# Patient Record
Sex: FEMALE | Employment: UNEMPLOYED | URBAN - METROPOLITAN AREA
[De-identification: names, ages, dates, MRNs, and addresses within clinical notes are randomized per-mention and may not be internally consistent; named-entity substitution may affect disease eponyms.]

---

## 2020-06-12 LAB
C. TRACHOMATIS, EXTERNAL RESULT: NEGATIVE
N. GONORRHOEAE, EXTERNAL RESULT: NEGATIVE

## 2020-08-21 LAB
ABO, EXTERNAL RESULT: NORMAL
HEP B, EXTERNAL RESULT: NEGATIVE
HIV, EXTERNAL RESULT: NEGATIVE
RH FACTOR, EXTERNAL RESULT: POSITIVE
RPR, EXTERNAL RESULT: NONREACTIVE
RUBELLA TITER, EXTERNAL RESULT: NORMAL

## 2021-02-10 ENCOUNTER — OFFICE VISIT (OUTPATIENT)
Dept: PRIMARY CARE CLINIC | Age: 36
End: 2021-02-10

## 2021-02-10 DIAGNOSIS — Z11.59 SCREENING FOR VIRAL DISEASE: Primary | ICD-10-CM

## 2021-02-10 DIAGNOSIS — Z01.818 PREOP TESTING: ICD-10-CM

## 2021-02-10 LAB — GBS, EXTERNAL RESULT: NEGATIVE

## 2021-02-10 PROCEDURE — 99211 OFF/OP EST MAY X REQ PHY/QHP: CPT | Performed by: NURSE PRACTITIONER

## 2021-02-10 NOTE — PROGRESS NOTES
Mooney Rasp received a viral test for COVID-19. They were educated on isolation and quarantine as appropriate. For any symptoms, they were directed to seek care from their PCP, given contact information to establish with a doctor, directed to an urgent care or the emergency room.

## 2021-02-11 LAB — SARS-COV-2, PCR: NOT DETECTED

## 2021-02-16 ENCOUNTER — HOSPITAL ENCOUNTER (INPATIENT)
Age: 36
LOS: 4 days | Discharge: HOME | End: 2021-02-20
Attending: OBSTETRICS & GYNECOLOGY | Admitting: OBSTETRICS & GYNECOLOGY
Payer: COMMERCIAL

## 2021-02-16 PROBLEM — O14.93 PREECLAMPSIA, THIRD TRIMESTER: Status: ACTIVE | Noted: 2021-02-16

## 2021-02-16 LAB
A/G RATIO: 1.1 (ref 1.1–2.2)
ABO/RH: NORMAL
ALBUMIN SERPL-MCNC: 3.7 G/DL (ref 3.4–5)
ALP BLD-CCNC: 165 U/L (ref 40–129)
ALT SERPL-CCNC: 21 U/L (ref 10–40)
AMPHETAMINE SCREEN, URINE: NORMAL
ANION GAP SERPL CALCULATED.3IONS-SCNC: 12 MMOL/L (ref 3–16)
ANTIBODY SCREEN: NORMAL
AST SERPL-CCNC: 19 U/L (ref 15–37)
BARBITURATE SCREEN URINE: NORMAL
BASOPHILS ABSOLUTE: 0.1 K/UL (ref 0–0.2)
BASOPHILS RELATIVE PERCENT: 0.7 %
BENZODIAZEPINE SCREEN, URINE: NORMAL
BILIRUB SERPL-MCNC: <0.2 MG/DL (ref 0–1)
BILIRUBIN URINE: NEGATIVE
BLOOD, URINE: ABNORMAL
BUN BLDV-MCNC: 12 MG/DL (ref 7–20)
BUPRENORPHINE URINE: NORMAL
CALCIUM SERPL-MCNC: 9.5 MG/DL (ref 8.3–10.6)
CANNABINOID SCREEN URINE: NORMAL
CHLORIDE BLD-SCNC: 101 MMOL/L (ref 99–110)
CLARITY: CLEAR
CO2: 20 MMOL/L (ref 21–32)
COCAINE METABOLITE SCREEN URINE: NORMAL
COLOR: YELLOW
CREAT SERPL-MCNC: <0.5 MG/DL (ref 0.6–1.1)
CREATININE URINE: 40.1 MG/DL (ref 28–259)
EOSINOPHILS ABSOLUTE: 0.2 K/UL (ref 0–0.6)
EOSINOPHILS RELATIVE PERCENT: 2.6 %
EPITHELIAL CELLS, UA: ABNORMAL /HPF (ref 0–5)
GFR AFRICAN AMERICAN: >60
GFR NON-AFRICAN AMERICAN: >60
GLOBULIN: 3.3 G/DL
GLUCOSE BLD-MCNC: 73 MG/DL (ref 70–99)
GLUCOSE URINE: NEGATIVE MG/DL
HCT VFR BLD CALC: 37.8 % (ref 36–48)
HEMOGLOBIN: 12.4 G/DL (ref 12–16)
KETONES, URINE: NEGATIVE MG/DL
LEUKOCYTE ESTERASE, URINE: NEGATIVE
LYMPHOCYTES ABSOLUTE: 2.1 K/UL (ref 1–5.1)
LYMPHOCYTES RELATIVE PERCENT: 24.8 %
Lab: NORMAL
MCH RBC QN AUTO: 30 PG (ref 26–34)
MCHC RBC AUTO-ENTMCNC: 33 G/DL (ref 31–36)
MCV RBC AUTO: 91 FL (ref 80–100)
METHADONE SCREEN, URINE: NORMAL
MICROSCOPIC EXAMINATION: YES
MONOCYTES ABSOLUTE: 0.7 K/UL (ref 0–1.3)
MONOCYTES RELATIVE PERCENT: 7.9 %
NEUTROPHILS ABSOLUTE: 5.5 K/UL (ref 1.7–7.7)
NEUTROPHILS RELATIVE PERCENT: 64 %
NITRITE, URINE: NEGATIVE
OPIATE SCREEN URINE: NORMAL
OXYCODONE URINE: NORMAL
PDW BLD-RTO: 12.9 % (ref 12.4–15.4)
PH UA: 7
PH UA: 7 (ref 5–8)
PHENCYCLIDINE SCREEN URINE: NORMAL
PLATELET # BLD: 173 K/UL (ref 135–450)
PMV BLD AUTO: 10.8 FL (ref 5–10.5)
POTASSIUM SERPL-SCNC: 3.9 MMOL/L (ref 3.5–5.1)
PROPOXYPHENE SCREEN: NORMAL
PROTEIN PROTEIN: 98 MG/DL
PROTEIN UA: 100 MG/DL
PROTEIN/CREAT RATIO: 2.4 MG/DL
RBC # BLD: 4.15 M/UL (ref 4–5.2)
RBC UA: ABNORMAL /HPF (ref 0–4)
SODIUM BLD-SCNC: 133 MMOL/L (ref 136–145)
SPECIFIC GRAVITY UA: 1.01 (ref 1–1.03)
TOTAL PROTEIN: 7 G/DL (ref 6.4–8.2)
URIC ACID, SERUM: 4.4 MG/DL (ref 2.6–6)
URINE TYPE: ABNORMAL
UROBILINOGEN, URINE: 0.2 E.U./DL
WBC # BLD: 8.6 K/UL (ref 4–11)
WBC UA: ABNORMAL /HPF (ref 0–5)

## 2021-02-16 PROCEDURE — 86900 BLOOD TYPING SEROLOGIC ABO: CPT

## 2021-02-16 PROCEDURE — 84550 ASSAY OF BLOOD/URIC ACID: CPT

## 2021-02-16 PROCEDURE — 84156 ASSAY OF PROTEIN URINE: CPT

## 2021-02-16 PROCEDURE — 86780 TREPONEMA PALLIDUM: CPT

## 2021-02-16 PROCEDURE — 6360000002 HC RX W HCPCS: Performed by: OBSTETRICS & GYNECOLOGY

## 2021-02-16 PROCEDURE — 2580000003 HC RX 258: Performed by: OBSTETRICS & GYNECOLOGY

## 2021-02-16 PROCEDURE — 86850 RBC ANTIBODY SCREEN: CPT

## 2021-02-16 PROCEDURE — 82570 ASSAY OF URINE CREATININE: CPT

## 2021-02-16 PROCEDURE — 85025 COMPLETE CBC W/AUTO DIFF WBC: CPT

## 2021-02-16 PROCEDURE — 6370000000 HC RX 637 (ALT 250 FOR IP): Performed by: OBSTETRICS & GYNECOLOGY

## 2021-02-16 PROCEDURE — 80307 DRUG TEST PRSMV CHEM ANLYZR: CPT

## 2021-02-16 PROCEDURE — 80053 COMPREHEN METABOLIC PANEL: CPT

## 2021-02-16 PROCEDURE — 81001 URINALYSIS AUTO W/SCOPE: CPT

## 2021-02-16 PROCEDURE — 2500000003 HC RX 250 WO HCPCS

## 2021-02-16 PROCEDURE — 86901 BLOOD TYPING SEROLOGIC RH(D): CPT

## 2021-02-16 PROCEDURE — 1220000000 HC SEMI PRIVATE OB R&B

## 2021-02-16 RX ORDER — BUDESONIDE AND FORMOTEROL FUMARATE DIHYDRATE 80; 4.5 UG/1; UG/1
2 AEROSOL RESPIRATORY (INHALATION) 2 TIMES DAILY
COMMUNITY

## 2021-02-16 RX ORDER — SODIUM CHLORIDE 0.9 % (FLUSH) 0.9 %
10 SYRINGE (ML) INJECTION PRN
Status: DISCONTINUED | OUTPATIENT
Start: 2021-02-16 | End: 2021-02-20 | Stop reason: HOSPADM

## 2021-02-16 RX ORDER — LABETALOL HYDROCHLORIDE 5 MG/ML
20 INJECTION, SOLUTION INTRAVENOUS ONCE
Status: COMPLETED | OUTPATIENT
Start: 2021-02-16 | End: 2021-02-16

## 2021-02-16 RX ORDER — LEVOTHYROXINE SODIUM 0.03 MG/1
75 TABLET ORAL DAILY
Status: DISCONTINUED | OUTPATIENT
Start: 2021-02-17 | End: 2021-02-20 | Stop reason: HOSPADM

## 2021-02-16 RX ORDER — LEVOTHYROXINE SODIUM 0.07 MG/1
75 TABLET ORAL DAILY
COMMUNITY

## 2021-02-16 RX ORDER — SODIUM CHLORIDE 0.9 % (FLUSH) 0.9 %
10 SYRINGE (ML) INJECTION EVERY 12 HOURS SCHEDULED
Status: DISCONTINUED | OUTPATIENT
Start: 2021-02-16 | End: 2021-02-20 | Stop reason: HOSPADM

## 2021-02-16 RX ORDER — MAGNESIUM SULFATE IN WATER 40 MG/ML
INJECTION, SOLUTION INTRAVENOUS
Status: DISCONTINUED
Start: 2021-02-16 | End: 2021-02-16 | Stop reason: WASHOUT

## 2021-02-16 RX ORDER — ONDANSETRON 2 MG/ML
4 INJECTION INTRAMUSCULAR; INTRAVENOUS EVERY 6 HOURS PRN
Status: DISCONTINUED | OUTPATIENT
Start: 2021-02-16 | End: 2021-02-20 | Stop reason: HOSPADM

## 2021-02-16 RX ORDER — DIPHENHYDRAMINE HYDROCHLORIDE 50 MG/ML
25 INJECTION INTRAMUSCULAR; INTRAVENOUS EVERY 4 HOURS PRN
Status: DISCONTINUED | OUTPATIENT
Start: 2021-02-16 | End: 2021-02-20 | Stop reason: HOSPADM

## 2021-02-16 RX ORDER — ACETAMINOPHEN 325 MG/1
650 TABLET ORAL EVERY 4 HOURS PRN
Status: DISCONTINUED | OUTPATIENT
Start: 2021-02-16 | End: 2021-02-20 | Stop reason: HOSPADM

## 2021-02-16 RX ORDER — MAGNESIUM SULFATE IN WATER 40 MG/ML
4000 INJECTION, SOLUTION INTRAVENOUS ONCE
Status: COMPLETED | OUTPATIENT
Start: 2021-02-16 | End: 2021-02-17

## 2021-02-16 RX ORDER — LIDOCAINE HYDROCHLORIDE 10 MG/ML
30 INJECTION, SOLUTION EPIDURAL; INFILTRATION; INTRACAUDAL; PERINEURAL PRN
Status: DISCONTINUED | OUTPATIENT
Start: 2021-02-16 | End: 2021-02-18

## 2021-02-16 RX ORDER — CETIRIZINE HYDROCHLORIDE 10 MG/1
10 TABLET ORAL DAILY
COMMUNITY

## 2021-02-16 RX ORDER — MAGNESIUM SULFATE IN WATER 40 MG/ML
INJECTION, SOLUTION INTRAVENOUS
Status: DISPENSED
Start: 2021-02-16 | End: 2021-02-17

## 2021-02-16 RX ORDER — LABETALOL HYDROCHLORIDE 5 MG/ML
INJECTION, SOLUTION INTRAVENOUS
Status: COMPLETED
Start: 2021-02-16 | End: 2021-02-16

## 2021-02-16 RX ORDER — SODIUM CHLORIDE, SODIUM LACTATE, POTASSIUM CHLORIDE, CALCIUM CHLORIDE 600; 310; 30; 20 MG/100ML; MG/100ML; MG/100ML; MG/100ML
INJECTION, SOLUTION INTRAVENOUS CONTINUOUS
Status: DISCONTINUED | OUTPATIENT
Start: 2021-02-16 | End: 2021-02-20 | Stop reason: HOSPADM

## 2021-02-16 RX ADMIN — Medication 25 MCG: at 20:10

## 2021-02-16 RX ADMIN — LABETALOL HYDROCHLORIDE 20 MG: 5 INJECTION INTRAVENOUS at 15:03

## 2021-02-16 RX ADMIN — SODIUM CHLORIDE, POTASSIUM CHLORIDE, SODIUM LACTATE AND CALCIUM CHLORIDE: 600; 310; 30; 20 INJECTION, SOLUTION INTRAVENOUS at 14:40

## 2021-02-16 RX ADMIN — LABETALOL HYDROCHLORIDE 20 MG: 5 INJECTION, SOLUTION INTRAVENOUS at 15:03

## 2021-02-16 RX ADMIN — LABETALOL HYDROCHLORIDE 20 MG: 5 INJECTION, SOLUTION INTRAVENOUS at 21:45

## 2021-02-16 RX ADMIN — SODIUM CHLORIDE, POTASSIUM CHLORIDE, SODIUM LACTATE AND CALCIUM CHLORIDE: 600; 310; 30; 20 INJECTION, SOLUTION INTRAVENOUS at 23:01

## 2021-02-16 RX ADMIN — Medication 25 MCG: at 15:55

## 2021-02-16 RX ADMIN — MAGNESIUM SULFATE 4000 MG: 4 INJECTION INTRAVENOUS at 22:09

## 2021-02-16 RX ADMIN — ACETAMINOPHEN 650 MG: 325 TABLET ORAL at 21:15

## 2021-02-16 RX ADMIN — MAGNESIUM SULFATE HEPTAHYDRATE 2 G/HR: 40 INJECTION, SOLUTION INTRAVENOUS at 22:09

## 2021-02-16 SDOH — HEALTH STABILITY: MENTAL HEALTH: HOW OFTEN DO YOU HAVE A DRINK CONTAINING ALCOHOL?: NEVER

## 2021-02-16 ASSESSMENT — PAIN SCALES - GENERAL: PAINLEVEL_OUTOF10: 6

## 2021-02-16 NOTE — PROGRESS NOTES
Pt put one efm at this time. Pt is feeling baby move. Pt denies leaking of amniotic fluid and vaginal bleeding.

## 2021-02-16 NOTE — PROGRESS NOTES
Pt presents to [facility] after being seen in the office and having elevated blood pressures and proteinuria. Pt ambulated to room 386 for admission.  Pt changed into gown and urine specimen obtained

## 2021-02-16 NOTE — PLAN OF CARE
Problem: Anxiety:  Goal: Level of anxiety will decrease  Description: Level of anxiety will decrease  Outcome: Ongoing     Problem: Breathing Pattern - Ineffective:  Goal: Able to breathe comfortably  Description: Able to breathe comfortably  Outcome: Ongoing     Problem: Fluid Volume - Imbalance:  Goal: Absence of imbalanced fluid volume signs and symptoms  Description: Absence of imbalanced fluid volume signs and symptoms  Outcome: Ongoing  Goal: Absence of intrapartum hemorrhage signs and symptoms  Description: Absence of intrapartum hemorrhage signs and symptoms  Outcome: Ongoing     Problem: Infection - Intrapartum Infection:  Goal: Will show no infection signs and symptoms  Description: Will show no infection signs and symptoms  Outcome: Ongoing     Problem: Labor Process - Prolonged:  Goal: Labor progression, first stage, within specified pattern  Description: Labor progression, first stage, within specified pattern  Outcome: Ongoing  Goal: Labor progession, second stage, within specified pattern  Description: Labor progession, second stage, within specified pattern  Outcome: Ongoing  Goal: Uterine contractions within specified parameters  Description: Uterine contractions within specified parameters  Outcome: Ongoing     Problem:  Screening:  Goal: Ability to make informed decisions regarding treatment has improved  Description: Ability to make informed decisions regarding treatment has improved  Outcome: Ongoing     Problem: Pain - Acute:  Goal: Pain level will decrease  Description: Pain level will decrease  Outcome: Ongoing  Goal: Able to cope with pain  Description: Able to cope with pain  Outcome: Ongoing     Problem: Tissue Perfusion - Uteroplacental, Altered:  Goal: Absence of abnormal fetal heart rate pattern  Description: Absence of abnormal fetal heart rate pattern  Outcome: Ongoing     Problem: Urinary Retention:  Goal: Experiences of bladder distention will decrease  Description: Experiences of bladder distention will decrease  Outcome: Ongoing  Goal: Urinary elimination within specified parameters  Description: Urinary elimination within specified parameters  Outcome: Ongoing       Pt in stable condition

## 2021-02-17 ENCOUNTER — ANESTHESIA (OUTPATIENT)
Dept: LABOR AND DELIVERY | Age: 36
End: 2021-02-17
Payer: COMMERCIAL

## 2021-02-17 ENCOUNTER — ANESTHESIA EVENT (OUTPATIENT)
Dept: LABOR AND DELIVERY | Age: 36
End: 2021-02-17
Payer: COMMERCIAL

## 2021-02-17 LAB — TOTAL SYPHILLIS IGG/IGM: NORMAL

## 2021-02-17 PROCEDURE — 3700000025 EPIDURAL BLOCK: Performed by: ANESTHESIOLOGY

## 2021-02-17 PROCEDURE — 6370000000 HC RX 637 (ALT 250 FOR IP): Performed by: OBSTETRICS & GYNECOLOGY

## 2021-02-17 PROCEDURE — 6360000002 HC RX W HCPCS

## 2021-02-17 PROCEDURE — 1220000000 HC SEMI PRIVATE OB R&B

## 2021-02-17 PROCEDURE — 2500000003 HC RX 250 WO HCPCS: Performed by: NURSE ANESTHETIST, CERTIFIED REGISTERED

## 2021-02-17 PROCEDURE — 2580000003 HC RX 258: Performed by: OBSTETRICS & GYNECOLOGY

## 2021-02-17 PROCEDURE — 6360000002 HC RX W HCPCS: Performed by: OBSTETRICS & GYNECOLOGY

## 2021-02-17 RX ORDER — EPHEDRINE SULFATE 50 MG/ML
INJECTION INTRAVENOUS PRN
Status: DISCONTINUED | OUTPATIENT
Start: 2021-02-17 | End: 2021-02-18 | Stop reason: SDUPTHER

## 2021-02-17 RX ORDER — BUPIVACAINE HYDROCHLORIDE 2.5 MG/ML
INJECTION, SOLUTION EPIDURAL; INFILTRATION; INTRACAUDAL PRN
Status: DISCONTINUED | OUTPATIENT
Start: 2021-02-17 | End: 2021-02-18 | Stop reason: SDUPTHER

## 2021-02-17 RX ORDER — BUPIVACAINE HYDROCHLORIDE 5 MG/ML
INJECTION, SOLUTION EPIDURAL; INTRACAUDAL PRN
Status: DISCONTINUED | OUTPATIENT
Start: 2021-02-17 | End: 2021-02-18 | Stop reason: SDUPTHER

## 2021-02-17 RX ORDER — BUPIVACAINE HYDROCHLORIDE 5 MG/ML
INJECTION, SOLUTION EPIDURAL; INTRACAUDAL
Status: COMPLETED
Start: 2021-02-17 | End: 2021-02-17

## 2021-02-17 RX ORDER — EPHEDRINE SULFATE 50 MG/ML
INJECTION INTRAVENOUS
Status: COMPLETED
Start: 2021-02-17 | End: 2021-02-17

## 2021-02-17 RX ORDER — BUPIVACAINE HYDROCHLORIDE 2.5 MG/ML
INJECTION, SOLUTION EPIDURAL; INFILTRATION; INTRACAUDAL
Status: COMPLETED
Start: 2021-02-17 | End: 2021-02-17

## 2021-02-17 RX ADMIN — ACETAMINOPHEN 650 MG: 325 TABLET ORAL at 13:17

## 2021-02-17 RX ADMIN — Medication 1 MILLI-UNITS/MIN: at 10:00

## 2021-02-17 RX ADMIN — BUPIVACAINE HYDROCHLORIDE 3 ML: 2.5 INJECTION, SOLUTION EPIDURAL; INFILTRATION; INTRACAUDAL; PERINEURAL at 14:23

## 2021-02-17 RX ADMIN — BUPIVACAINE HYDROCHLORIDE 3 ML: 5 INJECTION, SOLUTION EPIDURAL; INTRACAUDAL; PERINEURAL at 14:23

## 2021-02-17 RX ADMIN — MAGNESIUM SULFATE HEPTAHYDRATE 2 G/HR: 40 INJECTION, SOLUTION INTRAVENOUS at 17:44

## 2021-02-17 RX ADMIN — ACETAMINOPHEN 650 MG: 325 TABLET ORAL at 17:16

## 2021-02-17 RX ADMIN — Medication 15 ML/HR: at 14:27

## 2021-02-17 RX ADMIN — MAGNESIUM SULFATE HEPTAHYDRATE 2 G/HR: 40 INJECTION, SOLUTION INTRAVENOUS at 08:11

## 2021-02-17 RX ADMIN — ACETAMINOPHEN 650 MG: 325 TABLET ORAL at 03:18

## 2021-02-17 RX ADMIN — EPHEDRINE SULFATE 25 MG: 50 INJECTION INTRAVENOUS at 14:31

## 2021-02-17 RX ADMIN — ACETAMINOPHEN 650 MG: 325 TABLET ORAL at 07:37

## 2021-02-17 RX ADMIN — EPHEDRINE SULFATE 25 MG: 50 INJECTION INTRAVENOUS at 14:33

## 2021-02-17 RX ADMIN — SODIUM CHLORIDE, POTASSIUM CHLORIDE, SODIUM LACTATE AND CALCIUM CHLORIDE: 600; 310; 30; 20 INJECTION, SOLUTION INTRAVENOUS at 12:05

## 2021-02-17 RX ADMIN — SODIUM CHLORIDE, POTASSIUM CHLORIDE, SODIUM LACTATE AND CALCIUM CHLORIDE: 600; 310; 30; 20 INJECTION, SOLUTION INTRAVENOUS at 14:33

## 2021-02-17 RX ADMIN — LEVOTHYROXINE SODIUM 75 MCG: 0.03 TABLET ORAL at 07:37

## 2021-02-17 RX ADMIN — ONDANSETRON 4 MG: 2 INJECTION INTRAMUSCULAR; INTRAVENOUS at 10:24

## 2021-02-17 RX ADMIN — ACETAMINOPHEN 650 MG: 325 TABLET ORAL at 21:23

## 2021-02-17 ASSESSMENT — PAIN SCALES - GENERAL
PAINLEVEL_OUTOF10: 7
PAINLEVEL_OUTOF10: 5

## 2021-02-17 NOTE — PLAN OF CARE
Problem: Anxiety:  Goal: Level of anxiety will decrease  Description: Level of anxiety will decrease  2/17/2021 0731 by Blaise Anand RN  Outcome: Ongoing  2/16/2021 1952 by Olegario Oppenheim, RN  Outcome: Ongoing     Problem: Breathing Pattern - Ineffective:  Goal: Able to breathe comfortably  Description: Able to breathe comfortably  2/17/2021 0731 by Blaise Anand RN  Outcome: Ongoing  2/16/2021 1952 by Olegario Oppenheim, RN  Outcome: Ongoing     Problem: Fluid Volume - Imbalance:  Goal: Absence of imbalanced fluid volume signs and symptoms  Description: Absence of imbalanced fluid volume signs and symptoms  2/17/2021 0731 by Blaise Anand RN  Outcome: Ongoing  2/16/2021 1952 by Olegario Oppenheim, RN  Outcome: Ongoing  Goal: Absence of intrapartum hemorrhage signs and symptoms  Description: Absence of intrapartum hemorrhage signs and symptoms  2/17/2021 0731 by Blaise Anand RN  Outcome: Ongoing  2/16/2021 1952 by Olegario Oppenheim, RN  Outcome: Ongoing     Problem: Infection - Intrapartum Infection:  Goal: Will show no infection signs and symptoms  Description: Will show no infection signs and symptoms  2/17/2021 0731 by Blaise Anand RN  Outcome: Ongoing  2/16/2021 1952 by Olegario Oppenheim, RN  Outcome: Ongoing     Problem: Labor Process - Prolonged:  Goal: Labor progression, first stage, within specified pattern  Description: Labor progression, first stage, within specified pattern  2/17/2021 0731 by Blaise Anand RN  Outcome: Ongoing  2/16/2021 1952 by Olegario Oppenheim, RN  Outcome: Ongoing  Goal: Labor progession, second stage, within specified pattern  Description: Labor progession, second stage, within specified pattern  2/17/2021 0731 by Blaise Anand RN  Outcome: Ongoing  2/16/2021 1952 by Olegario Oppenheim, RN  Outcome: Ongoing  Goal: Uterine contractions within specified parameters  Description: Uterine contractions within specified parameters  2/17/2021 0731 by Cheryle Hopping Marcia Pruitt RN  Outcome: Ongoing  2021 by Alina Brooks RN  Outcome: Ongoing     Problem:  Screening:  Goal: Ability to make informed decisions regarding treatment has improved  Description: Ability to make informed decisions regarding treatment has improved  2021 by Eliana Allen RN  Outcome: Ongoing  2021 by Alina Brooks RN  Outcome: Ongoing     Problem: Pain - Acute:  Goal: Pain level will decrease  Description: Pain level will decrease  2021 by Eliana Allen RN  Outcome: Ongoing  2021 by Alina Brooks RN  Outcome: Ongoing  Goal: Able to cope with pain  Description: Able to cope with pain  2021 by Eliana Allen RN  Outcome: Ongoing  2021 by Alina Brooks RN  Outcome: Ongoing     Problem: Tissue Perfusion - Uteroplacental, Altered:  Goal: Absence of abnormal fetal heart rate pattern  Description: Absence of abnormal fetal heart rate pattern  2021 by Eliana Allen RN  Outcome: Ongoing  2021 by Alina Brooks RN  Outcome: Ongoing     Problem: Urinary Retention:  Goal: Experiences of bladder distention will decrease  Description: Experiences of bladder distention will decrease  2021 by Eliana Allen RN  Outcome: Ongoing  2021 by Alina Brooks RN  Outcome: Ongoing  Goal: Urinary elimination within specified parameters  Description: Urinary elimination within specified parameters  2021 by Eliana Allen RN  Outcome: Ongoing  2021 by Alina Brooks RN  Outcome: Ongoing     Problem: Pain:  Goal: Pain level will decrease  Description: Pain level will decrease  2021 by Eliana Allen RN  Outcome: Ongoing  2021 by Alina Brooks RN  Outcome: Ongoing  Goal: Control of acute pain  Description: Control of acute pain  Outcome: Ongoing  Goal: Control of chronic pain  Description: Control of chronic pain  Outcome: Ongoing

## 2021-02-17 NOTE — PROGRESS NOTES
Dr. Meli Abdi notified via telephone to update that stone bulb came out. Also made him aware that patient is having small amount of bleeding after stone bulb removal.  Attempted SVE and was unable to check cervix d/t fetus/cervix high. FHR remains category 1 at this time. States to sit patient up and will be to bedside to evaluate vaginal bleeding.

## 2021-02-17 NOTE — ANESTHESIA PRE PROCEDURE
Department of Anesthesiology  Preprocedure Note       Name:  Bienvenido Hill   Age:  37 y.o.  :  1985                                          MRN:  [id number]         Date:  2021      Surgeon: * No surgeons listed *    Procedure: * No procedures listed *    Medications prior to admission:   Prior to Admission medications    Medication Sig Start Date End Date Taking?  Authorizing Provider   levothyroxine (SYNTHROID) 75 MCG tablet Take 75 mcg by mouth Daily   Yes Historical Provider, MD   Prenatal MV-Min-Fe Fum-FA-DHA (PRENATAL 1 PO) Take 1 tablet by mouth daily   Yes Historical Provider, MD   budesonide-formoterol (SYMBICORT) 80-4.5 MCG/ACT AERO Inhale 2 puffs into the lungs 2 times daily   Yes Historical Provider, MD   cetirizine (ZYRTEC) 10 MG tablet Take 10 mg by mouth daily   Yes Historical Provider, MD       Current medications:    Current Facility-Administered Medications   Medication Dose Route Frequency Provider Last Rate Last Admin    oxytocin (PITOCIN) 30 units in 500 mL infusion  1-20 jennyfer-units/min Intravenous Continuous Ashley Mancia MD 6 mL/hr at 21 1400 6 jennyfer-units/min at 21 1400    lactated ringers infusion   Intravenous Continuous Suzy Marlow MD 69 mL/hr at 21 1433 New Bag at 21 1433    sodium chloride flush 0.9 % injection 10 mL  10 mL Intravenous 2 times per day Suzy Marolw MD        sodium chloride flush 0.9 % injection 10 mL  10 mL Intravenous PRN Suzy Marlow MD        lidocaine PF 1 % injection 30 mL  30 mL Other PRN Suzy Marlow MD        [facility] injection 4 mg  4 mg Intravenous Q6H PRN Suzy Marlow MD   4 mg at 21 1024    diphenhydrAMINE (BENADRYL) injection 25 mg  25 mg Intravenous Q4H PRN Suzy Marlow MD        acetaminophen (TYLENOL) tablet 650 mg  650 mg Oral Q4H PRN Suzy Marlow MD   650 mg at 21 1317  levothyroxine (SYNTHROID) tablet 75 mcg  75 mcg Oral Daily Rolan Castro MD   75 mcg at 02/17/21 3764    magnesium sulfate (20 G/500 mL) infusion  2 g/hr Intravenous Continuous Rolan Castro MD 50 mL/hr at 02/17/21 1300 2 g/hr at 02/17/21 1300     Facility-Administered Medications Ordered in Other Encounters   Medication Dose Route Frequency Provider Last Rate Last Admin    bupivacaine (PF) (MARCAINE) 0.25 % injection    PRN Kiera Hakarlaing, APRN - CRNA   3 mL at 02/17/21 1423    bupivacaine (PF) (MARCAINE) 0.5 % injection    PRN Kiera Lewis, APRN - CRNA   3 mL at 02/17/21 1423    sodium chloride 0.9 % 200 mL with fentaNYL 500 mcg, bupivacaine 0.5% 50 mL (OB) epidural    Continuous PRN Kiera Hakarlaing, APRN - CRNA 15 mL/hr at 02/17/21 1427 15 mL/hr at 02/17/21 1427    ePHEDrine injection    PRN Kiera Lewis, APRN - CRNA   25 mg at 02/17/21 1433       Allergies:  No Known Allergies    Problem List:    Patient Active Problem List   Diagnosis Code    Preeclampsia, third trimester O14.93       Past Medical History:        Diagnosis Date    Anxiety     Asthma     currently uses inhaler    Hypertension     third trimester htn    Thyroid disease     hpothyroidism- synthroid       Past Surgical History:  History reviewed. No pertinent surgical history.     Social History:    Social History     Tobacco Use    Smoking status: Never Smoker    Smokeless tobacco: Never Used   Substance Use Topics    Alcohol use: Never     Frequency: Never                                Counseling given: Not Answered      Vital Signs (Current):   Vitals:    02/17/21 1434 02/17/21 1437 02/17/21 1440 02/17/21 1443   BP: 139/81 138/77 (!) 144/88 (!) 145/83   Pulse: 87 90 96 102   Resp: 16 16 16 16   Temp:       TempSrc:       SpO2:       Weight:       Height:                                                  BP Readings from Last 3 Encounters:   02/17/21 (!) 145/83 NPO Status: Time of last liquid consumption: 1130                        Time of last solid consumption: 1100                        Date of last liquid consumption: 02/16/21                        Date of last solid food consumption: 02/16/21    BMI:   Wt Readings from Last 3 Encounters:   02/16/21 182 lb (82.6 kg)     Body mass index is 31.24 kg/m². CBC:   Lab Results   Component Value Date    WBC 8.6 02/16/2021    RBC 4.15 02/16/2021    HGB 12.4 02/16/2021    HCT 37.8 02/16/2021    MCV 91.0 02/16/2021    RDW 12.9 02/16/2021     02/16/2021       CMP:   Lab Results   Component Value Date     02/16/2021    K 3.9 02/16/2021     02/16/2021    CO2 20 02/16/2021    BUN 12 02/16/2021    CREATININE <0.5 02/16/2021    GFRAA >60 02/16/2021    AGRATIO 1.1 02/16/2021    LABGLOM >60 02/16/2021    GLUCOSE 73 02/16/2021    PROT 7.0 02/16/2021    CALCIUM 9.5 02/16/2021    BILITOT <0.2 02/16/2021    ALKPHOS 165 02/16/2021    AST 19 02/16/2021    ALT 21 02/16/2021       POC Tests: No results for input(s): POCGLU, POCNA, POCK, POCCL, POCBUN, POCHEMO, POCHCT in the last 72 hours.     Coags: No results found for: PROTIME, INR, APTT    HCG (If Applicable): No results found for: PREGTESTUR, PREGSERUM, HCG, HCGQUANT     ABGs: No results found for: PHART, PO2ART, RRT2AUM, TSK9VIS, BEART, Q1APSNJO     Type & Screen (If Applicable):  No results found for: LABABO, LABRH    Drug/Infectious Status (If Applicable):  No results found for: HIV, HEPCAB    COVID-19 Screening (If Applicable):   Lab Results   Component Value Date    COVID19 Not Detected 02/10/2021         Anesthesia Evaluation  Patient summary reviewed and Nursing notes reviewed no history of anesthetic complications:   Airway: Mallampati: II  TM distance: >3 FB   Neck ROM: full   Dental:          Pulmonary:   (+) asthma:                            Cardiovascular:    (+) hypertension:,                   Neuro/Psych:   (+) headaches: migraine headaches, GI/Hepatic/Renal: Neg GI/Hepatic/Renal ROS            Endo/Other:    (+) hypothyroidism::., .                 Abdominal:           Vascular: negative vascular ROS. Anesthesia Plan      epidural     ASA 3 - emergent             Anesthetic plan and risks discussed with patient. Plan discussed with attending. Alternatives to, benifits and risks of continuous lumbar epidural for labor (including, but not limited to, hypotension, spinal headache, inadequate sensory blockade) were discussed in detail with the patient. All questions were answered to her satisfaction.   The patient desires and agrees to proceed with continuous epidural.    MAYRA Walden - CRNA   2/17/2021

## 2021-02-17 NOTE — PROGRESS NOTES
Spoke to Dr. Lincoln Allen at this time to inform him of pt /101. Telephone order to give 20mg labetolol, 4g bolus of magnesium sulfate, and start 2g/hr infusion of magnesium sulfate. Will continue to monitor.

## 2021-02-17 NOTE — PLAN OF CARE
Problem: Anxiety:  Goal: Level of anxiety will decrease  Description: Level of anxiety will decrease  2/16/2021 1952 by Alma Love RN  Outcome: Ongoing  2/16/2021 1646 by Ana Leyva RN  Outcome: Ongoing     Problem: Breathing Pattern - Ineffective:  Goal: Able to breathe comfortably  Description: Able to breathe comfortably  2/16/2021 1952 by Alma Love RN  Outcome: Ongoing  2/16/2021 1646 by Ana Leyva RN  Outcome: Ongoing     Problem: Fluid Volume - Imbalance:  Goal: Absence of imbalanced fluid volume signs and symptoms  Description: Absence of imbalanced fluid volume signs and symptoms  2/16/2021 1952 by Alma Love RN  Outcome: Ongoing  2/16/2021 1646 by Ana Leyva RN  Outcome: Ongoing  Goal: Absence of intrapartum hemorrhage signs and symptoms  Description: Absence of intrapartum hemorrhage signs and symptoms  2/16/2021 1952 by Alma Love RN  Outcome: Ongoing  2/16/2021 1646 by Ana Leyva RN  Outcome: Ongoing     Problem: Infection - Intrapartum Infection:  Goal: Will show no infection signs and symptoms  Description: Will show no infection signs and symptoms  2/16/2021 1952 by Alma Love RN  Outcome: Ongoing  2/16/2021 1646 by Ana Leyva RN  Outcome: Ongoing     Problem: Labor Process - Prolonged:  Goal: Labor progression, first stage, within specified pattern  Description: Labor progression, first stage, within specified pattern  2/16/2021 1952 by Alma Love RN  Outcome: Ongoing  2/16/2021 1646 by Ana Leyva RN  Outcome: Ongoing  Goal: Labor progession, second stage, within specified pattern  Description: Labor progession, second stage, within specified pattern  2/16/2021 1952 by Alma Love RN  Outcome: Ongoing  2/16/2021 1646 by Ana Leyva RN  Outcome: Ongoing  Goal: Uterine contractions within specified parameters  Description: Uterine contractions within specified parameters  2/16/2021 1952 by Rai Gilliam Eva Campuzano RN  Outcome: Ongoing  2021 1646 by Stiven Schwab RN  Outcome: Ongoing     Problem:  Screening:  Goal: Ability to make informed decisions regarding treatment has improved  Description: Ability to make informed decisions regarding treatment has improved  2021 by Adolph Bazan RN  Outcome: Ongoing  2021 1646 by Stiven Schwab RN  Outcome: Ongoing     Problem: Pain - Acute:  Goal: Pain level will decrease  Description: Pain level will decrease  2021 by Adolph Bazan RN  Outcome: Ongoing  2021 1646 by Stiven Schwab RN  Outcome: Ongoing  Goal: Able to cope with pain  Description: Able to cope with pain  2021 by Adolph Bazan RN  Outcome: Ongoing  2021 1646 by Stiven Schwab RN  Outcome: Ongoing     Problem: Tissue Perfusion - Uteroplacental, Altered:  Goal: Absence of abnormal fetal heart rate pattern  Description: Absence of abnormal fetal heart rate pattern  2021 by Adolph Bazan RN  Outcome: Ongoing  2021 1646 by Stiven Schwab RN  Outcome: Ongoing     Problem: Urinary Retention:  Goal: Experiences of bladder distention will decrease  Description: Experiences of bladder distention will decrease  2021 by Adolph Bazan RN  Outcome: Ongoing  2021 1646 by Stiven Schwab RN  Outcome: Ongoing  Goal: Urinary elimination within specified parameters  Description: Urinary elimination within specified parameters  2021 by Adolph Bazan RN  Outcome: Ongoing  2021 1646 by Stiven Schwab RN  Outcome: Ongoing

## 2021-02-17 NOTE — PROGRESS NOTES
Dr. Wong Necessary at bedside discussing plan of care, patient status, FHR tracing, updated on patient headache and nausea/vomitting and blood pressures, and stone bulb still in place. Will monitor.

## 2021-02-17 NOTE — PROGRESS NOTES
Cervical balloon catheter still in place. FHR tracing reassuring  BPs WNL  Contractions q 3-5 mins on 2 mIU/min Pitocin    PLAN:  Continue IOL, magnesium. Epidural prn.     Cecil Agarwal MD

## 2021-02-17 NOTE — PROGRESS NOTES
Pt comfortable with epidural  FHR tracing reassuring  Ctxns q 2-5 mins on 6 mIU/min Pitocin  Cvx outer os 4-5 cm, inner os 3 cm, 75% effaced, -3 to -4 station. BPs still in mild range. U.O adequate. PLAN:  Titrate Pitocin. Continue magnesium and close observation of BP.     Jeremy Townsend MD

## 2021-02-17 NOTE — PROGRESS NOTES
Dr. Marti Whelan at bedside at this time to perform SVE. Small amount of shannon red blood noted. Provider aware and visualized. Attempted SVE but unable to feel cervix due to stone bulb still in place. Will continue to monitor.

## 2021-02-17 NOTE — PROGRESS NOTES
Department of Obstetrics and Gynecology  Labor and Delivery   Attending Progress Note      SUBJECTIVE:  Pt reports increasing contractions    OBJECTIVE:      Fetal heart rate:       Baseline Heart Rate:  140        Accelerations:  present       Long Term Variability:  moderate       Decelerations:  absent         Contraction frequency: 4 minutes    Membranes:  Intact    Cervix:         Dilation:  1 cm         Effacement:  50%         Station:  -3         Position:  mid             ASSESSMENT & PLAN:    Preeclampsia with severe features undergoing induction    Continue induction cytotec/pit

## 2021-02-17 NOTE — PROGRESS NOTES
Chart reviewed and patient examined. Pt feels well, only c/o mild HA. Cvx 1/75%/-3, posterior.    Ctxns q 3 mins  FHR tracing reassuring  Cervical balloon catheter placed and inflated with 60 mL saline for uterine and vaginal balloons  Will start pitocin augmentation  BPs in mild range since last labetalol dose  Magnesium continues at 2 gms/hr  Epidural prn    Kath Moscoso MD

## 2021-02-17 NOTE — PROGRESS NOTES
Cervical stone balloon catheter placed at this time. 60 mL in both uterine and vaginal balloons. Will monitor. Pitocin to be started at 1000 per Dr. Kendra Duncan.

## 2021-02-17 NOTE — ANESTHESIA PROCEDURE NOTES
Epidural Block    Patient location during procedure: OB  Start time: 2/17/2021 2:16 PM  End time: 2/17/2021 2:25 PM  Reason for block: labor epidural  Staffing  Performed: resident/CRNA   Anesthesiologist: Saadia Vazquez MD  Resident/CRNA: MAYRA Valencia CRNA  Preanesthetic Checklist  Completed: patient identified, IV checked, site marked, risks and benefits discussed, monitors and equipment checked, pre-op evaluation, timeout performed, anesthesia consent given, oxygen available and patient being monitored  Epidural  Patient position: sitting  Prep: Betadine  Patient monitoring: cardiac monitor, continuous pulse ox and frequent blood pressure checks  Approach: midline  Location: lumbar (1-5)  Injection technique: ISABELL saline  Provider prep: mask and sterile gloves  Needle  Needle type: Tuohy   Needle gauge: 17 G  Needle length: 3.5 in  Catheter type: side hole  Catheter size: 19 G (20 G)  Test dose: negative (3 + 2 cc of 1.5 % xylocaine with epi)  Assessment  Sensory level: T8  Hemodynamics: unstable (IVF and IV/IM ephedrine)  Attempts: 1  Additional Notes  Pt. prepped and draped in sterile fashion. Skin wheal with 1% lidocaine. 17ga touhy needle to ISABELL. 25 ga. Spinal needle per touhy. CSF visualized in hub. Needle withdrawn and catheter threaded. Negative test dose. Catheter secured with sterile dressing.

## 2021-02-18 VITALS — SYSTOLIC BLOOD PRESSURE: 141 MMHG | DIASTOLIC BLOOD PRESSURE: 79 MMHG | OXYGEN SATURATION: 96 %

## 2021-02-18 LAB
HCT VFR BLD CALC: 28 % (ref 36–48)
HEMOGLOBIN: 9.4 G/DL (ref 12–16)

## 2021-02-18 PROCEDURE — 85018 HEMOGLOBIN: CPT

## 2021-02-18 PROCEDURE — 2500000003 HC RX 250 WO HCPCS: Performed by: NURSE ANESTHETIST, CERTIFIED REGISTERED

## 2021-02-18 PROCEDURE — 6360000002 HC RX W HCPCS: Performed by: OBSTETRICS & GYNECOLOGY

## 2021-02-18 PROCEDURE — 2580000003 HC RX 258: Performed by: OBSTETRICS & GYNECOLOGY

## 2021-02-18 PROCEDURE — 3609079900 HC CESAREAN SECTION: Performed by: OBSTETRICS & GYNECOLOGY

## 2021-02-18 PROCEDURE — 3700000001 HC ADD 15 MINUTES (ANESTHESIA): Performed by: OBSTETRICS & GYNECOLOGY

## 2021-02-18 PROCEDURE — 1220000000 HC SEMI PRIVATE OB R&B

## 2021-02-18 PROCEDURE — 3700000000 HC ANESTHESIA ATTENDED CARE: Performed by: OBSTETRICS & GYNECOLOGY

## 2021-02-18 PROCEDURE — 85014 HEMATOCRIT: CPT

## 2021-02-18 PROCEDURE — 6360000002 HC RX W HCPCS: Performed by: NURSE ANESTHETIST, CERTIFIED REGISTERED

## 2021-02-18 PROCEDURE — 2709999900 HC NON-CHARGEABLE SUPPLY: Performed by: OBSTETRICS & GYNECOLOGY

## 2021-02-18 PROCEDURE — 7100000001 HC PACU RECOVERY - ADDTL 15 MIN: Performed by: OBSTETRICS & GYNECOLOGY

## 2021-02-18 PROCEDURE — 7100000000 HC PACU RECOVERY - FIRST 15 MIN: Performed by: OBSTETRICS & GYNECOLOGY

## 2021-02-18 PROCEDURE — 36415 COLL VENOUS BLD VENIPUNCTURE: CPT

## 2021-02-18 RX ORDER — FERROUS SULFATE 325(65) MG
325 TABLET ORAL 2 TIMES DAILY WITH MEALS
Status: DISCONTINUED | OUTPATIENT
Start: 2021-02-18 | End: 2021-02-20 | Stop reason: HOSPADM

## 2021-02-18 RX ORDER — MORPHINE SULFATE 0.5 MG/ML
INJECTION, SOLUTION EPIDURAL; INTRATHECAL; INTRAVENOUS
Status: DISCONTINUED
Start: 2021-02-18 | End: 2021-02-18

## 2021-02-18 RX ORDER — DOCUSATE SODIUM 100 MG/1
100 CAPSULE, LIQUID FILLED ORAL 2 TIMES DAILY PRN
Status: DISCONTINUED | OUTPATIENT
Start: 2021-02-18 | End: 2021-02-20 | Stop reason: HOSPADM

## 2021-02-18 RX ORDER — IBUPROFEN 800 MG/1
800 TABLET ORAL EVERY 6 HOURS PRN
Status: DISCONTINUED | OUTPATIENT
Start: 2021-02-18 | End: 2021-02-20 | Stop reason: HOSPADM

## 2021-02-18 RX ORDER — MORPHINE SULFATE 10 MG/ML
INJECTION, SOLUTION INTRAMUSCULAR; INTRAVENOUS PRN
Status: DISCONTINUED | OUTPATIENT
Start: 2021-02-18 | End: 2021-02-18 | Stop reason: SDUPTHER

## 2021-02-18 RX ORDER — ONDANSETRON 2 MG/ML
INJECTION INTRAMUSCULAR; INTRAVENOUS PRN
Status: DISCONTINUED | OUTPATIENT
Start: 2021-02-18 | End: 2021-02-18 | Stop reason: SDUPTHER

## 2021-02-18 RX ORDER — FAMOTIDINE 20 MG/1
20 TABLET, FILM COATED ORAL PRN
Status: DISCONTINUED | OUTPATIENT
Start: 2021-02-18 | End: 2021-02-20 | Stop reason: HOSPADM

## 2021-02-18 RX ORDER — OXYCODONE HYDROCHLORIDE AND ACETAMINOPHEN 5; 325 MG/1; MG/1
2 TABLET ORAL EVERY 4 HOURS PRN
Status: DISCONTINUED | OUTPATIENT
Start: 2021-02-18 | End: 2021-02-20 | Stop reason: HOSPADM

## 2021-02-18 RX ORDER — SIMETHICONE 80 MG
80 TABLET,CHEWABLE ORAL EVERY 6 HOURS PRN
Status: DISCONTINUED | OUTPATIENT
Start: 2021-02-18 | End: 2021-02-20 | Stop reason: HOSPADM

## 2021-02-18 RX ORDER — KETOROLAC TROMETHAMINE 30 MG/ML
30 INJECTION, SOLUTION INTRAMUSCULAR; INTRAVENOUS EVERY 6 HOURS
Status: COMPLETED | OUTPATIENT
Start: 2021-02-18 | End: 2021-02-19

## 2021-02-18 RX ORDER — ACETAMINOPHEN 325 MG/1
650 TABLET ORAL EVERY 4 HOURS PRN
Status: DISCONTINUED | OUTPATIENT
Start: 2021-02-18 | End: 2021-02-20 | Stop reason: HOSPADM

## 2021-02-18 RX ORDER — FENTANYL CITRATE 50 UG/ML
INJECTION, SOLUTION INTRAMUSCULAR; INTRAVENOUS PRN
Status: DISCONTINUED | OUTPATIENT
Start: 2021-02-18 | End: 2021-02-18 | Stop reason: SDUPTHER

## 2021-02-18 RX ORDER — OXYTOCIN 10 [USP'U]/ML
INJECTION, SOLUTION INTRAMUSCULAR; INTRAVENOUS PRN
Status: DISCONTINUED | OUTPATIENT
Start: 2021-02-18 | End: 2021-02-18 | Stop reason: SDUPTHER

## 2021-02-18 RX ORDER — OXYCODONE HYDROCHLORIDE AND ACETAMINOPHEN 5; 325 MG/1; MG/1
1 TABLET ORAL EVERY 4 HOURS PRN
Status: DISCONTINUED | OUTPATIENT
Start: 2021-02-18 | End: 2021-02-20 | Stop reason: HOSPADM

## 2021-02-18 RX ORDER — LANOLIN 100 %
OINTMENT (GRAM) TOPICAL
Status: DISCONTINUED | OUTPATIENT
Start: 2021-02-18 | End: 2021-02-20 | Stop reason: HOSPADM

## 2021-02-18 RX ORDER — LIDOCAINE HYDROCHLORIDE 20 MG/ML
INJECTION, SOLUTION EPIDURAL; INFILTRATION; INTRACAUDAL; PERINEURAL PRN
Status: DISCONTINUED | OUTPATIENT
Start: 2021-02-18 | End: 2021-02-18 | Stop reason: SDUPTHER

## 2021-02-18 RX ORDER — PRENATAL WITH FERROUS FUM AND FOLIC ACID 3080; 920; 120; 400; 22; 1.84; 3; 20; 10; 1; 12; 200; 27; 25; 2 [IU]/1; [IU]/1; MG/1; [IU]/1; MG/1; MG/1; MG/1; MG/1; MG/1; MG/1; UG/1; MG/1; MG/1; MG/1; MG/1
1 TABLET ORAL DAILY
Status: DISCONTINUED | OUTPATIENT
Start: 2021-02-18 | End: 2021-02-20 | Stop reason: HOSPADM

## 2021-02-18 RX ADMIN — LIDOCAINE HYDROCHLORIDE 5 ML: 20 INJECTION, SOLUTION EPIDURAL; INFILTRATION; INTRACAUDAL; PERINEURAL at 03:38

## 2021-02-18 RX ADMIN — MORPHINE SULFATE 1 MG: 10 INJECTION, SOLUTION INTRAMUSCULAR; INTRAVENOUS at 04:36

## 2021-02-18 RX ADMIN — CEFAZOLIN SODIUM 2 G: 10 INJECTION, POWDER, FOR SOLUTION INTRAVENOUS at 03:55

## 2021-02-18 RX ADMIN — ONDANSETRON 4 MG: 2 INJECTION INTRAMUSCULAR; INTRAVENOUS at 09:59

## 2021-02-18 RX ADMIN — ONDANSETRON 4 MG: 2 INJECTION INTRAMUSCULAR; INTRAVENOUS at 03:45

## 2021-02-18 RX ADMIN — ONDANSETRON 4 MG: 2 INJECTION INTRAMUSCULAR; INTRAVENOUS at 16:08

## 2021-02-18 RX ADMIN — EPHEDRINE SULFATE 20 MG: 50 INJECTION INTRAVENOUS at 04:02

## 2021-02-18 RX ADMIN — KETOROLAC TROMETHAMINE 30 MG: 30 INJECTION, SOLUTION INTRAMUSCULAR at 07:38

## 2021-02-18 RX ADMIN — LIDOCAINE HYDROCHLORIDE 5 ML: 20 INJECTION, SOLUTION EPIDURAL; INFILTRATION; INTRACAUDAL; PERINEURAL at 03:45

## 2021-02-18 RX ADMIN — FAMOTIDINE 20 MG: 10 INJECTION, SOLUTION INTRAVENOUS at 03:45

## 2021-02-18 RX ADMIN — SODIUM CHLORIDE, POTASSIUM CHLORIDE, SODIUM LACTATE AND CALCIUM CHLORIDE: 600; 310; 30; 20 INJECTION, SOLUTION INTRAVENOUS at 20:25

## 2021-02-18 RX ADMIN — KETOROLAC TROMETHAMINE 30 MG: 30 INJECTION, SOLUTION INTRAMUSCULAR at 17:11

## 2021-02-18 RX ADMIN — KETOROLAC TROMETHAMINE 30 MG: 30 INJECTION, SOLUTION INTRAMUSCULAR at 23:03

## 2021-02-18 RX ADMIN — OXYTOCIN 20 UNITS: 10 INJECTION INTRAVENOUS at 04:23

## 2021-02-18 RX ADMIN — MAGNESIUM SULFATE HEPTAHYDRATE 2 G/HR: 40 INJECTION, SOLUTION INTRAVENOUS at 03:48

## 2021-02-18 RX ADMIN — SODIUM CHLORIDE, POTASSIUM CHLORIDE, SODIUM LACTATE AND CALCIUM CHLORIDE: 600; 310; 30; 20 INJECTION, SOLUTION INTRAVENOUS at 05:54

## 2021-02-18 RX ADMIN — LIDOCAINE HYDROCHLORIDE 5 ML: 20 INJECTION, SOLUTION EPIDURAL; INFILTRATION; INTRACAUDAL; PERINEURAL at 03:41

## 2021-02-18 RX ADMIN — ONDANSETRON 4 MG: 2 INJECTION INTRAMUSCULAR; INTRAVENOUS at 23:03

## 2021-02-18 RX ADMIN — MAGNESIUM SULFATE HEPTAHYDRATE 2 G/HR: 40 INJECTION, SOLUTION INTRAVENOUS at 14:36

## 2021-02-18 RX ADMIN — Medication 10 ML: at 23:04

## 2021-02-18 RX ADMIN — FENTANYL CITRATE 100 MCG: 50 INJECTION INTRAMUSCULAR; INTRAVENOUS at 04:25

## 2021-02-18 RX ADMIN — EPHEDRINE SULFATE 20 MG: 50 INJECTION INTRAVENOUS at 04:38

## 2021-02-18 RX ADMIN — SODIUM CHLORIDE, POTASSIUM CHLORIDE, SODIUM LACTATE AND CALCIUM CHLORIDE: 600; 310; 30; 20 INJECTION, SOLUTION INTRAVENOUS at 04:01

## 2021-02-18 RX ADMIN — MORPHINE SULFATE 1 MG: 10 INJECTION, SOLUTION INTRAMUSCULAR; INTRAVENOUS at 04:25

## 2021-02-18 ASSESSMENT — PULMONARY FUNCTION TESTS
PIF_VALUE: 0
PIF_VALUE: 1
PIF_VALUE: 0

## 2021-02-18 ASSESSMENT — PAIN SCALES - GENERAL
PAINLEVEL_OUTOF10: 6
PAINLEVEL_OUTOF10: 6

## 2021-02-18 NOTE — PROGRESS NOTES
Dr Wong Necessary notified in department that pt is now on 20 milliu/min of pitocin. No changes to plan of care at this time.

## 2021-02-18 NOTE — OP NOTE
[address] [facility]                 [facility] HospitalAdithyaenmanuel                                 OPERATIVE REPORT    PATIENT NAME: Alice Gusman                    :        1985  MED REC NO:   [id number]                          ROOM:       [id number]  ACCOUNT NO:   [de-identified]                           ADMIT DATE: 2021  PROVIDER:     Camelia Jimenez MD    DATE OF PROCEDURE:  2021    PREOPERATIVE DIAGNOSES:  Intrauterine pregnancy at 37 weeks and 6 days,  preeclampsia with severe features, failure to progress. POSTOPERATIVE DIAGNOSES:  Intrauterine pregnancy at 37 weeks and 6 days,  preeclampsia with severe features, failure to progress, delivered. OPERATION PERFORMED:  Primary low-transverse  section. SURGEON:  Camelia Jimenez MD    ANESTHESIA:  Epidural.    INDICATIONS AND CONSENT FOR PROCEDURE:  The patient is a 53-year-old   1, para 0 female who presented at 37 weeks and 4 days from the  office. She presented to the office and had elevated blood pressures  and she did complain of a headache as well. She was evaluated in Labor  and Delivery and was found to have elevated blood pressures, which were  in the 150s to 160s over 97 to 105. She had laboratory studies done,  which returned showing a significant amount of proteinuria and a  protein-creatinine ratio of 2.4. Her platelets and liver functions were  normal.  Due to the diagnosis of preeclampsia with severe features, she  was started on magnesium infusion and started in the cervical ripening  with misoprostol. She did require two doses of labetalol to bring her  blood pressures down into the acceptable range and she has not required  any further antihypertensives since those initial two doses. She  received two doses of the misoprostol in the morning of the , she  was 1 cm dilated, 70% effaced, -3 station with a posterior cervix.   A cervical balloon catheter was placed and inflated with 60 mL of saline  for mechanical dilation and she was started on a Pitocin infusion at  that time, several hours later, the balloon catheter was removed and the  patient did request received an epidural for labor analgesia. Examination was repeated and the cervix was now 3 cm dilated in the  inner os and 75% effaced, but the presenting part was still high in the  pelvis at -3 to -4 station. Blood pressures remained in the mild range. The patient continued to titrate the Pitocin and got up to 20 milliunits  per minute of Pitocin and after being on this dosage for approximately 4  hours, she had no further dilatation or descent of the presenting part. The poor prognosis for vaginal delivery was reviewed with the patient  and her  and  section was recommended. She was aware of  the risks of surgery, including anesthesia, infection, injury to bowel  or urinary tract, hemorrhage requiring transfusion with risk of HIV or  hepatitis and possible need for further surgery. She understood these  risks and signed an informed consent. OPERATIVE PROCEDURE:  With the patient under satisfactory epidural  anesthesia, she was placed in the supine position with a left lateral  tilt. A Allen catheter was placed. The abdomen was prepped and draped  in the usual sterile fashion. A Pfannenstiel incision was made and this  was carried down to the level of fascia with the Bovie. The fascia was  scored with a scalpel and the fascial incisions were extended laterally  with a Ortega scissors. Lacy Mylar clamps were placed on the inferior margin  of the rectus fascia and using blunt and sharp dissection, the  underlying rectus muscles were  away. This was done in a  similar fashion superiorly. The rectus muscles were divided in the  midline and the peritoneum was then entered sharply.   The peritoneal incision was extended superiorly and inferiorly taking care to  transilluminate so as not to injure the bladder. The large [employer] [employer] was then placed. The vesicouterine reflection of the parietal  peritoneum was then taken down sharply. It was noted at that time that  the vertex was not engaged at all in the pelvis, despite the hours of  attempted induction of labor. A transverse incision was made in lower  uterine segment. Once entry into the uterine cavity was accomplished,  the incision was extended with the 's fingers. Attempts were  made to deliver the infant through the incision with the [employer]  in place, but this was unsuccessful so the [employer] was removed  allowing for better exposure and then with this, the infant was  delivered through the incision with a modest amount of fundal pressure  from the assistant. The infant was vigorous and crying immediately  after delivery. The cord was doubly clamped and cut and the infant was  handed to the pediatric nurse in attendance. The bladder blade was then  placed and then the placenta was delivered with traction. The uterus  was exteriorized. Membranes were swept free from the interior surface  of the uterus. Fallopian tubes and ovaries were noted to be normal.   There were no extensions of the uterine incision. The incision was  closed with a running locking suture of 0 Monocryl, followed by an  imbricating layer of 0 Monocryl for excellent hemostasis. The uterus  was replaced back into the abdominal cavity. Copious amounts of sterile  saline was used to irrigate the pelvis. All visible peritoneal surfaces  and the uterine incision were inspected bleeding and none was found. The peritoneum was then closed with a running suture of 3-0 Vicryl. The  subfascial layers and rectus muscles were inspected for bleeding and  none was found. The fascia was closed with a running suture of 0 Maxon. The subcutaneous fat was reapproximated with a running suture of 3-0  Vicryl. The skin was then closed with a running subcuticular suture of  4-0 Monocryl. Dermabond glue was then placed over the incision. The  patient was then taken to recovery room in satisfactory condition. All  sponge, needle and instrument counts were correct and there were no  complications. Estimated blood loss was 500 mL and fluid replacement  was with crystalloid. FINDINGS:  The patient was delivered of a viable male infant. Apgars 8  at one minute and 9 at five minutes, birthweight 7 pounds 11 ounces. Normal placenta, uterus, tubes and ovaries.         Musa Mustafa MD    D: 02/18/2021 5:08:14       T: 02/18/2021 5:16:07     [id number]  Job#: [id number]     Doc#: [id number]    CC:

## 2021-02-18 NOTE — PROGRESS NOTES
Department of Obstetrics and Gynecology  Labor and Delivery   Attending Progress Note      SUBJECTIVE:  Patient reports \"dizziness when I sit up and then when I start feeling dizzy, it causes nausea\". Patient also reports \"I have to close one eye to focus\". OBJECTIVE:    BP (!) 152/92   Pulse 73   Temp 97.9 °F (36.6 °C) (Oral)   Resp 16   Ht 5' 4\" (1.626 m)   Wt 182 lb (82.6 kg)   LMP 2020 (Exact Date)   SpO2 99%   Breastfeeding Unknown   BMI 31.24 kg/m²   RRR CTA B    UOP: 60 - 120 cc /hour             ASSESSMENT & PLAN:    37 y.o.    OB History        1    Para   1    Term   1            AB        Living   1       SAB        TAB        Ectopic        Molar        Multiple   0    Live Births   1             37w6d  1. BP elevated but not in severe range. Not tolerating po. Zofran given. Pt declined additional anti-emetic. Will continue to monitor closely. On MgSO4 for seizure prophylaxis  2. QBL: 1607 cc.   Will check H/H.

## 2021-02-18 NOTE — PROGRESS NOTES
Pt assisted by 2 staff members to chair for first time get up. Pt denies dizziness or lightheadedness. Gait steady. Allen in place. Pericare done by pt with RN instruction. New pad and panties put on pt. Gown changed. Pt states she is feeling dizzy. Pt helped by to bed. Hand hygiene done. Complete linen change done and comfort pad put on bed.

## 2021-02-18 NOTE — PROGRESS NOTES
Pt remains comfortable with epidural  FHR tracing reassuring  Ctxns q 3 mins on 20 mIU/min Pitocin  Cvx exam remains unchanged with inner os at 3 cm, -4 station. BPs remain in the normal to mild range. Urine output adequate  No cervical change despite being on 20 mIU/min Pitocin x > 4hrs  Poor prognosis for vaginal delivery reviewed with the patient and . C/S discussed along with R/B/A/C/Se. Pt wishes to proceed.     Jaent Decker MD

## 2021-02-18 NOTE — PROGRESS NOTES
Dr Richardson Robles at bedside. SVE unchanged at 3/75/. Decision made at this time by both the patient and Dr Richardson Robles to proceed with . Pitocin discontinued at this time. Will prep for OR. No other changes at this time.

## 2021-02-18 NOTE — PROGRESS NOTES
DOS  Patient without complaints  BP's stable, MgSO4 for seizure prophylaxis  Desires male infant circumcised. Procedure reviewed. Will perform 2./19.

## 2021-02-18 NOTE — PROGRESS NOTES
Dr. Rajeev Powell notified of pt complaint of dizziness and request for something to help with it. Pt has been complaining of a headache all day and nausea off and on. Pt has had blurred vision off and on the last 6 hours as well. Pt last blood pressure was 152/92. Dr. Rajeev Powell will be at bedside soon to evaluate pt and speak with her in person.

## 2021-02-18 NOTE — BRIEF OP NOTE
Brief Postoperative Note      Patient: Richard Slaughter  YOB: 1985  MRN: [id number]    Date of Procedure: 2/18/2021    Pre-Op Diagnosis: IUP at 37w6d, pre-eclampsia with severe features, failure to progress    Post-Op Diagnosis: same, delivered       Procedure:  Primary LTCS    Surgeon(s):  Hiwot Guajardo MD    Assistant:  * No surgical staff found *    Anesthesia: Spinal    Estimated Blood Loss (mL): 925    Complications: None    Specimens:   * No specimens in log *    Implants:  * No implants in log *      Drains:   Urethral Catheter Non-latex (Active)   Urine Color Yellow 02/17/21 2000   Urine Appearance Clear 02/17/21 2000   Output (mL) 90 mL 02/18/21 0100       Findings: VMI Apgars 8 & 9, weight 7#11 oz, normal placenta, uterus, tubes, ovaries. See dictated op note.     Electronically signed by Nir Monk MD on 2/18/2021 at 4:54 AM

## 2021-02-18 NOTE — PROGRESS NOTES
Report received from [facility] (LUZMARIA). Bedside report given. Introduced myself to pt as her RN for the day. I put my name and phone number on the white board and showed pt how to use her room phone to get a hold of me. Pt was given her plan of care for the day. Call light within reach. Bed in lowest position and wheels are locked. Pt verbalized understanding and denies any further needs at this time. Continue to monitor.

## 2021-02-18 NOTE — PLAN OF CARE
Problem: Anxiety:  Goal: Level of anxiety will decrease  Description: Level of anxiety will decrease  2/18/2021 0907 by Yee Mendez RN  Outcome: Ongoing  2/17/2021 2216 by Jeff Escalante RN  Outcome: Ongoing     Problem: Breathing Pattern - Ineffective:  Goal: Able to breathe comfortably  Description: Able to breathe comfortably  2/18/2021 0907 by Yee Mendez RN  Outcome: Ongoing  2/17/2021 2216 by Jeff Escalante RN  Outcome: Ongoing     Problem: Breathing Pattern - Ineffective:  Goal: Able to breathe comfortably  Description: Able to breathe comfortably  2/18/2021 0907 by Yee Mendez RN  Outcome: Ongoing  2/17/2021 2216 by Jeff Escalante RN  Outcome: Ongoing     Problem: Fluid Volume - Imbalance:  Goal: Absence of imbalanced fluid volume signs and symptoms  Description: Absence of imbalanced fluid volume signs and symptoms  2/18/2021 0907 by Yee Mendez RN  Outcome: Ongoing  2/17/2021 2216 by Jeff Escalante RN  Outcome: Ongoing  Goal: Absence of intrapartum hemorrhage signs and symptoms  Description: Absence of intrapartum hemorrhage signs and symptoms  2/18/2021 0907 by Yee Mendez RN  Outcome: Ongoing  2/17/2021 2216 by Jeff Escalante RN  Outcome: Ongoing  Goal: Absence of postpartum hemorrhage signs and symptoms  Description: Absence of postpartum hemorrhage signs and symptoms  Outcome: Ongoing     Problem: Labor Process - Prolonged:  Goal: Labor progression, first stage, within specified pattern  Description: Labor progression, first stage, within specified pattern  2/18/2021 0907 by Yee Mendez RN  Outcome: Ongoing  2/17/2021 2216 by Jeff Escalante RN  Outcome: Ongoing  Goal: Labor progession, second stage, within specified pattern  Description: Labor progession, second stage, within specified pattern  2/18/2021 0907 by Yee Mendez RN  Outcome: Ongoing  2/17/2021 2216 by Jeff Escalante RN  Outcome: Ongoing  Goal: Uterine contractions within specified parameters  Description: Uterine contractions within specified parameters  2021 0907 by Chris Sandoval RN  Outcome: Ongoing  2021 221 by Nilton Allen RN  Outcome: Ongoing     Problem:  Screening:  Goal: Ability to make informed decisions regarding treatment has improved  Description: Ability to make informed decisions regarding treatment has improved  2021 0907 by Chris Sandoval RN  Outcome: Ongoing  2021 by Nilton Allen RN  Outcome: Ongoing     Problem: Pain - Acute:  Goal: Pain level will decrease  Description: Pain level will decrease  2021 0907 by Chris Sandoval RN  Outcome: Ongoing  2021 221 by Nilton Allen RN  Outcome: Ongoing  Goal: Able to cope with pain  Description: Able to cope with pain  2021 0907 by Chris Sandoval RN  Outcome: Ongoing  2021 by Nilton Allen RN  Outcome: Ongoing     Problem: Tissue Perfusion - Uteroplacental, Altered:  Goal: Absence of abnormal fetal heart rate pattern  Description: Absence of abnormal fetal heart rate pattern  2021 0907 by Chris Sandoval RN  Outcome: Ongoing  2021 by Nilton Allen RN  Outcome: Ongoing     Problem: Urinary Retention:  Goal: Experiences of bladder distention will decrease  Description: Experiences of bladder distention will decrease  2021 0907 by Chris Sandoval RN  Outcome: Ongoing  2021 by Nilton Allen RN  Outcome: Ongoing  Goal: Urinary elimination within specified parameters  Description: Urinary elimination within specified parameters  2021 0907 by Chris Sandoval RN  Outcome: Ongoing  2021 by Nilton Allen RN  Outcome: Ongoing     Problem: Pain:  Goal: Pain level will decrease  Description: Pain level will decrease  2021 0907 by Chris Sandoval RN  Outcome: Ongoing  2021 by Nilton Allen RN  Outcome: Ongoing  Goal: Control of acute pain  Description: Control of acute pain  2021 0907 by Chris Sandoval RN  Outcome: Ongoing  2021 by René Qiu Geo Sewell RN  Outcome: Ongoing  Goal: Control of chronic pain  Description: Control of chronic pain  2/18/2021 0907 by Alee Aquino RN  Outcome: Ongoing  2/17/2021 2216 by Christiano Atwood RN  Outcome: Ongoing     Problem: Infection - Surgical Site:  Goal: Will show no infection signs and symptoms  Description: Will show no infection signs and symptoms  2/18/2021 0907 by Alee Aquino RN  Outcome: Ongoing  2/17/2021 2216 by Christiano Atwood RN  Outcome: Ongoing     Problem: Discharge Planning:  Goal: Discharged to appropriate level of care  Description: Discharged to appropriate level of care  Outcome: Ongoing     Problem: Mood - Altered:  Goal: Mood stable  Description: Mood stable  Outcome: Ongoing     Problem: Nausea/Vomiting:  Goal: Absence of nausea/vomiting  Description: Absence of nausea/vomiting  Outcome: Ongoing     Problem: Venous Thromboembolism:  Goal: Will show no signs or symptoms of venous thromboembolism  Description: Will show no signs or symptoms of venous thromboembolism  Outcome: Ongoing  Goal: Absence of signs or symptoms of impaired coagulation  Description: Absence of signs or symptoms of impaired coagulation  Outcome: Ongoing

## 2021-02-19 LAB
HCT VFR BLD CALC: 26.1 % (ref 36–48)
HEMOGLOBIN: 8.8 G/DL (ref 12–16)
MCH RBC QN AUTO: 31.1 PG (ref 26–34)
MCHC RBC AUTO-ENTMCNC: 33.7 G/DL (ref 31–36)
MCV RBC AUTO: 92.3 FL (ref 80–100)
PDW BLD-RTO: 12.8 % (ref 12.4–15.4)
PLATELET # BLD: 153 K/UL (ref 135–450)
PMV BLD AUTO: 9.5 FL (ref 5–10.5)
RBC # BLD: 2.83 M/UL (ref 4–5.2)
WBC # BLD: 7.9 K/UL (ref 4–11)

## 2021-02-19 PROCEDURE — 6360000002 HC RX W HCPCS: Performed by: OBSTETRICS & GYNECOLOGY

## 2021-02-19 PROCEDURE — 85027 COMPLETE CBC AUTOMATED: CPT

## 2021-02-19 PROCEDURE — 36415 COLL VENOUS BLD VENIPUNCTURE: CPT

## 2021-02-19 PROCEDURE — 1220000000 HC SEMI PRIVATE OB R&B

## 2021-02-19 PROCEDURE — 6370000000 HC RX 637 (ALT 250 FOR IP): Performed by: OBSTETRICS & GYNECOLOGY

## 2021-02-19 PROCEDURE — 2580000003 HC RX 258: Performed by: OBSTETRICS & GYNECOLOGY

## 2021-02-19 RX ADMIN — ONDANSETRON 4 MG: 2 INJECTION INTRAMUSCULAR; INTRAVENOUS at 05:15

## 2021-02-19 RX ADMIN — ACETAMINOPHEN 650 MG: 325 TABLET ORAL at 00:35

## 2021-02-19 RX ADMIN — FERROUS SULFATE TAB 325 MG (65 MG ELEMENTAL FE) 325 MG: 325 (65 FE) TAB at 08:44

## 2021-02-19 RX ADMIN — ACETAMINOPHEN 650 MG: 325 TABLET ORAL at 20:30

## 2021-02-19 RX ADMIN — DOCUSATE SODIUM 100 MG: 100 CAPSULE, LIQUID FILLED ORAL at 20:26

## 2021-02-19 RX ADMIN — Medication 10 ML: at 08:45

## 2021-02-19 RX ADMIN — KETOROLAC TROMETHAMINE 30 MG: 30 INJECTION, SOLUTION INTRAMUSCULAR at 05:15

## 2021-02-19 RX ADMIN — DOCUSATE SODIUM 100 MG: 100 CAPSULE, LIQUID FILLED ORAL at 08:45

## 2021-02-19 RX ADMIN — METFORMIN HYDROCHLORIDE 1 TABLET: 500 TABLET, EXTENDED RELEASE ORAL at 08:44

## 2021-02-19 RX ADMIN — MAGNESIUM SULFATE HEPTAHYDRATE 2 G/HR: 40 INJECTION, SOLUTION INTRAVENOUS at 00:15

## 2021-02-19 RX ADMIN — Medication 10 ML: at 20:27

## 2021-02-19 RX ADMIN — FERROUS SULFATE TAB 325 MG (65 MG ELEMENTAL FE) 325 MG: 325 (65 FE) TAB at 20:27

## 2021-02-19 RX ADMIN — IBUPROFEN 800 MG: 800 TABLET, FILM COATED ORAL at 16:33

## 2021-02-19 RX ADMIN — IBUPROFEN 800 MG: 800 TABLET, FILM COATED ORAL at 22:33

## 2021-02-19 RX ADMIN — ACETAMINOPHEN 650 MG: 325 TABLET ORAL at 12:08

## 2021-02-19 RX ADMIN — LEVOTHYROXINE SODIUM 75 MCG: 0.03 TABLET ORAL at 06:59

## 2021-02-19 RX ADMIN — Medication 10 ML: at 05:15

## 2021-02-19 ASSESSMENT — PAIN SCALES - GENERAL
PAINLEVEL_OUTOF10: 5
PAINLEVEL_OUTOF10: 4
PAINLEVEL_OUTOF10: 5

## 2021-02-19 NOTE — PLAN OF CARE
first stage, within specified pattern  Description: Labor progression, first stage, within specified pattern  2021 by Ashok Calzada RN  Outcome: Completed  2021 by Ashok Calzada RN  Outcome: Ongoing  2021 0907 by Chris Sandoval RN  Outcome: Ongoing  Goal: Labor progession, second stage, within specified pattern  Description: Labor progession, second stage, within specified pattern  2021 by Ashok Calzada RN  Outcome: Completed  2021 by Ashok Calzada RN  Outcome: Ongoing  2021 0907 by Chris Sandoval RN  Outcome: Ongoing  Goal: Uterine contractions within specified parameters  Description: Uterine contractions within specified parameters  2021 by Ashok Calzada RN  Outcome: Completed  2021 by Ashok Calzada RN  Outcome: Ongoing  2021 0907 by Chris Sandoval RN  Outcome: Ongoing     Problem:  Screening:  Goal: Ability to make informed decisions regarding treatment has improved  Description: Ability to make informed decisions regarding treatment has improved  2021 by Ashok Calzada RN  Outcome: Completed  2021 by Ashok Calzada RN  Outcome: Ongoing  2021 09 by Chris Sandoval RN  Outcome: Ongoing     Problem: Pain - Acute:  Goal: Pain level will decrease  Description: Pain level will decrease  2021 by Ashok Calzada RN  Outcome: Completed  2021 by Ashok Calzada RN  Outcome: Ongoing  2021 0907 by Chris Sandoval RN  Outcome: Ongoing  Goal: Able to cope with pain  Description: Able to cope with pain  2021 by Ashok Calzada RN  Outcome: Completed  2021 by Ashok Calzada RN  Outcome: Ongoing  2021 09 by Chris Sandoval RN  Outcome: Ongoing     Problem: Tissue Perfusion - Uteroplacental, Altered:  Description: [TRUNCATED] For intrapartum patients with recurrent variable decelerations of the fetal heart rate, consider transcervical amnioinfusion. For patients in labor, avoid prophylactic use of continuous maternal oxygen supplementation to prevent nonreassu . .. Goal: Absence of abnormal fetal heart rate pattern  Description: Absence of abnormal fetal heart rate pattern  2/18/2021 2038 by Elaine Washington RN  Outcome: Completed  2/18/2021 2037 by Elaine Washington RN  Outcome: Ongoing  2/18/2021 0907 by Carlton Murphy RN  Outcome: Ongoing     Problem: Urinary Retention:  Goal: Experiences of bladder distention will decrease  Description: Experiences of bladder distention will decrease  2/18/2021 2038 by Elaine Washington RN  Outcome: Completed  2/18/2021 2037 by Elaine Washington RN  Outcome: Ongoing  2/18/2021 0907 by Carlton Murphy RN  Outcome: Ongoing  Goal: Urinary elimination within specified parameters  Description: Urinary elimination within specified parameters  2/18/2021 2038 by Elaine Washington RN  Outcome: Completed  2/18/2021 2037 by Elaine Washington RN  Outcome: Ongoing  2/18/2021 0907 by Carlton Murphy RN  Outcome: Ongoing     Problem: Pain:  Description: Pain management should include both nonpharmacologic and pharmacologic interventions.   Goal: Pain level will decrease  Description: Pain level will decrease  2/18/2021 2038 by Elaine Washington RN  Outcome: Completed  2/18/2021 2037 by Elaine Washington RN  Outcome: Ongoing  2/18/2021 0907 by Carlton Murphy RN  Outcome: Ongoing  Goal: Control of acute pain  Description: Control of acute pain  2/18/2021 2038 by Elaine Washington RN  Outcome: Completed  2/18/2021 2037 by Elaine Washington RN  Outcome: Ongoing  2/18/2021 0907 by Carlton Muprhy RN  Outcome: Ongoing  Goal: Control of chronic pain  Description: Control of chronic pain  2/18/2021 2038 by Elaine Washington RN  Outcome: Completed  2/18/2021 2037 by Elaine Washington RN  Outcome: Ongoing  2/18/2021 0907 by Carlton Murphy RN  Outcome: Ongoing     Problem: Discharge Planning:  Goal: Discharged to appropriate level of care  Description: Discharged to appropriate level of care  2/18/2021 2038 by Maldonado Rogers RN  Outcome: Completed  2/18/2021 2037 by Maldonado Rogers RN  Outcome: Ongoing  2/18/2021 0907 by Cherie Pulnkett RN  Outcome: Ongoing     Problem: Infection - Surgical Site:  Goal: Will show no infection signs and symptoms  Description: Will show no infection signs and symptoms  2/18/2021 2038 by Maldonado Rogers RN  Outcome: Completed  2/18/2021 2037 by Maldonado Rogers RN  Outcome: Ongoing  2/18/2021 0907 by Cherie Plunkett RN  Outcome: Ongoing     Problem: Mood - Altered:  Goal: Mood stable  Description: Mood stable  2/18/2021 2038 by Maldonado Rogers RN  Outcome: Completed  2/18/2021 2037 by Maldonado Rogers RN  Outcome: Ongoing  2/18/2021 0907 by Cherie Plunkett RN  Outcome: Ongoing     Problem: Nausea/Vomiting:  Goal: Absence of nausea/vomiting  Description: Absence of nausea/vomiting  2/18/2021 2038 by Maldonado Rogers RN  Outcome: Completed  2/18/2021 2037 by Maldonado Rogers RN  Outcome: Ongoing  2/18/2021 0907 by Cherie Plunkett RN  Outcome: Ongoing     Problem: Venous Thromboembolism:  Goal: Will show no signs or symptoms of venous thromboembolism  Description: Will show no signs or symptoms of venous thromboembolism  2/18/2021 2038 by Maldonado Rogers RN  Outcome: Completed  2/18/2021 2037 by Maldonado Rogers RN  Outcome: Ongoing  2/18/2021 0907 by Cherie Plunkett RN  Outcome: Ongoing  Goal: Absence of signs or symptoms of impaired coagulation  Description: Absence of signs or symptoms of impaired coagulation  2/18/2021 2038 by Maldonado Rogers RN  Outcome: Completed  2/18/2021 2037 by Maldonado Rogers RN  Outcome: Ongoing  2/18/2021 0907 by Cherie Plunkett RN  Outcome: Ongoing     Problem: Discharge Planning:  Goal: Discharged to appropriate level of care  Description: Discharged to appropriate level of care  Outcome: Ongoing     Problem: Fluid Volume - Imbalance:  Goal: Absence of postpartum hemorrhage signs and symptoms  Description: Absence of postpartum hemorrhage signs and symptoms  Outcome: Ongoing  Goal: Absence of imbalanced fluid volume signs and symptoms  Description: Absence of imbalanced fluid volume signs and symptoms  Outcome: Ongoing     Problem: Infection - Surgical Site:  Goal: Will show no infection signs and symptoms  Description: Will show no infection signs and symptoms  Outcome: Ongoing     Problem: Mood - Altered:  Goal: Mood stable  Description: Mood stable  Outcome: Ongoing     Problem: Nausea/Vomiting:  Goal: Absence of nausea/vomiting  Description: Absence of nausea/vomiting  Outcome: Ongoing     Problem: Pain - Acute:  Goal: Pain level will decrease  Description: Pain level will decrease  Outcome: Ongoing     Problem: Urinary Retention:  Goal: Urinary elimination within specified parameters  Description: Urinary elimination within specified parameters  Outcome: Ongoing     Problem: Venous Thromboembolism:  Goal: Will show no signs or symptoms of venous thromboembolism  Description: Will show no signs or symptoms of venous thromboembolism  Outcome: Ongoing  Goal: Absence of signs or symptoms of impaired coagulation  Description: Absence of signs or symptoms of impaired coagulation  Outcome: Ongoing

## 2021-02-19 NOTE — PLAN OF CARE
Problem: Discharge Planning:  Goal: Discharged to appropriate level of care  Description: Discharged to appropriate level of care  2/19/2021 0758 by Woodrow You RN  Outcome: Ongoing  2/18/2021 2038 by Lisa Pate RN  Outcome: Ongoing     Problem: Fluid Volume - Imbalance:  Goal: Absence of postpartum hemorrhage signs and symptoms  Description: Absence of postpartum hemorrhage signs and symptoms  2/19/2021 0758 by Woodrow You RN  Outcome: Ongoing  2/18/2021 2038 by Lisa Pate RN  Outcome: Ongoing  Goal: Absence of imbalanced fluid volume signs and symptoms  Description: Absence of imbalanced fluid volume signs and symptoms  2/19/2021 0758 by Woodrow You RN  Outcome: Ongoing  2/18/2021 2038 by Lisa Pate RN  Outcome: Ongoing     Problem: Infection - Surgical Site:  Goal: Will show no infection signs and symptoms  Description: Will show no infection signs and symptoms  2/19/2021 0758 by Woodrow You RN  Outcome: Ongoing  2/18/2021 2038 by Lisa Pate RN  Outcome: Ongoing     Problem: Mood - Altered:  Goal: Mood stable  Description: Mood stable  2/19/2021 0758 by Woodrow You RN  Outcome: Ongoing  2/18/2021 2038 by Lisa Pate RN  Outcome: Ongoing     Problem: Nausea/Vomiting:  Goal: Absence of nausea/vomiting  Description: Absence of nausea/vomiting  2/19/2021 0758 by Woodrow You RN  Outcome: Ongoing  2/18/2021 2038 by Lisa Pate RN  Outcome: Ongoing     Problem: Pain - Acute:  Goal: Pain level will decrease  Description: Pain level will decrease  2/19/2021 0758 by Woodrow You RN  Outcome: Ongoing  2/18/2021 2038 by Lisa Pate RN  Outcome: Ongoing     Problem: Urinary Retention:  Goal: Urinary elimination within specified parameters  Description: Urinary elimination within specified parameters  2/19/2021 0758 by Woodrow You RN  Outcome: Ongoing  2/18/2021 2038 by Lisa Pate RN  Outcome: Ongoing     Problem: Venous Thromboembolism:  Goal: Will show no signs or symptoms of venous thromboembolism  Description: Will show no signs or symptoms of venous thromboembolism  2/19/2021 0758 by Vonda Thomason RN  Outcome: Ongoing  2/18/2021 2038 by Harvey Murray RN  Outcome: Ongoing  Goal: Absence of signs or symptoms of impaired coagulation  Description: Absence of signs or symptoms of impaired coagulation  2/19/2021 0758 by Vonda Thomason RN  Outcome: Ongoing  2/18/2021 2038 by Harvey Murray RN  Outcome: Ongoing

## 2021-02-19 NOTE — PROGRESS NOTES
Department of Obstetrics and Gynecology  Labor and Delivery  Attending Post Partum Progress Note      SUBJECTIVE:  Pt without complaints, pain controlled, tolerating po, lochia wnl,  whether the patient is currently breastfeeding. positive flatus. Feeling much better since d/c of MgSo4    OBJECTIVE:      Vitals:  Vitals:    21 1207   BP: 138/80   Pulse: 78   Resp: 16   Temp:    SpO2:        RRR CTAB  ABDOMEN:  soft, non-distended, non-tender, + BS  FF below umbilicus  Incision: c/d/i   EXT: no edema    DATA:    CBC:    Lab Results   Component Value Date    WBC 7.9 2021    HGB 8.8 2021    HCT 26.1 2021     2021       ASSESSMENT & PLAN:    37 y.o.   OB History        1    Para   1    Term   1            AB        Living   1       SAB        TAB        Ectopic        Molar        Multiple   0    Live Births   1             s/p C/S pod# 1  1. Doing well, continue routine post-op care. 2.  Desires male infant circumcised. 3.  Anemia/: PNV + Fe.    4.  Pre-eclampsia with severe features. BP mildly elevated. Will continue to observe closely. May need to start Procardia 30 mg XL.

## 2021-02-19 NOTE — ANESTHESIA POSTPROCEDURE EVALUATION
Department of Anesthesiology  Postprocedure Note    Patient: Liv Seymour  MRN: [id number]  [place]: 1985  Date of evaluation: 2021  Time:  7:40 AM     Procedure Summary     Date: 21 Room / Location: [facility]    Anesthesia Start: 1401 Anesthesia Stop: 21 0500    Procedures:        SECTION (N/A Abdomen)      Labor Analgesia Diagnosis: (Failure to progress)    Surgeons: Vanessa Funez MD Responsible Provider: Juan Daugherty MD    Anesthesia Type: epidural ASA Status: 3 - Emergent          Anesthesia Type: epidural    Jenny Phase I: Jenny Score: 9    Jenny Phase II: Jenny Score: 10    Last vitals: Reviewed and per EMR flowsheets. Anesthesia Post Evaluation    Level of consciousness: awake and alert  Nausea & Vomiting: no vomiting and no nausea  Complications: no  Cardiovascular status: hemodynamically stable  Respiratory status: acceptable and room air  Hydration status: stable  Comments: POD #1 s/p  section under epidural anesthesia with duramorph. Progress notes, flow sheets, labs, and MARs reviewed. No apparent or reported anesthesia complications thus far. Patient is a 22y old  Female who presents with a chief complaint of Acute hypoxic respiratory failure, status epilepticus (25 Aug 2020 18:28)    BRIEF HOSPITAL COURSE:   21 y/o female with PMHx of Depression, Migraines, prior Suicide attempt by NSAID OD in 2018 presents after being found unresponsive by father at home earlier today. Pt reportedly found by father in her bedroom this AM, unresponsive, diaphoretic.  Per pt's father known history of drug use, no pill bottles or needles in surrounding area. EMS was called, en route, pt developed decorticate posturing and shortly after had 2 witnessed seizures, s/p IV Versed. In ED, pt with further generalized tonic clonic activity, s/p IV Ativan, but remained largely unresponsive. Pt was subsequently intubated for airway protection, started on propofol gtt. Stat CT head negative for acute intracranial pathology.    Events last 24 hours:   No active issues. Extubated overnight. EEG ongoing. AAO x3, sitting up in chair and having breakfast  EEG; No epileptiform pattern or seizure seen.    PAST MEDICAL & SURGICAL HISTORY:  Migraines  Depression    Review of Systems:  CONSTITUTIONAL: No fever, chills, or fatigue  EYES: No eye pain, visual disturbances, or discharge  ENMT:  No difficulty hearing, tinnitus, vertigo; No sinus or throat pain  NECK: No pain or stiffness  RESPIRATORY: No cough, wheezing, chills or hemoptysis; No shortness of breath  CARDIOVASCULAR: No chest pain, palpitations, dizziness, or leg swelling  GASTROINTESTINAL: No abdominal or epigastric pain. No nausea, vomiting, or hematemesis; No diarrhea or constipation. No melena or hematochezia.  GENITOURINARY: No dysuria, frequency, hematuria, or incontinence  NEUROLOGICAL: No headaches, memory loss, loss of strength, numbness, or tremors  SKIN: No itching, burning, rashes, or lesions   MUSCULOSKELETAL: No joint pain or swelling; No muscle, back, or extremity pain  PSYCHIATRIC: No depression, anxiety, mood swings, or difficulty sleeping    Physical Examination:    ICU Vital Signs Last 24 Hrs  T(C): 36.9 (26 Aug 2020 08:00), Max: 37.1 (26 Aug 2020 03:21)  T(F): 98.4 (26 Aug 2020 08:00), Max: 98.7 (26 Aug 2020 03:21)  HR: 101 (26 Aug 2020 10:00) (86 - 104)  BP: 113/70 (26 Aug 2020 10:00) (80/43 - 126/83)  BP(mean): 86 (26 Aug 2020 10:00) (56 - 100)  ABP: --  ABP(mean): --  RR: 17 (26 Aug 2020 10:00) (16 - 28)  SpO2: 97% (26 Aug 2020 10:00) (91% - 100%)      General: No acute distress.      Neuro: AAO*3, No motor, sensory, or cranial nerve deficit    HEENT: Pupils equal, reactive to light, Moist oral mucosa    PULM: Clear to auscultation bilaterally, no significant adventitious breath sounds     CVS: Regular rhythm and controlled rate, no murmurs, rubs, or gallops    ABD: Soft, nondistended, nontender, normoactive bowel sounds    EXT: No b/l LE edema, nontender with pedal pulse palpable     SKIN: Warm and well perfused, no acute rashes       Medications:        levETIRAcetam 1000 milliGRAM(s) Oral two times a day  LORazepam   Injectable 1 milliGRAM(s) IV Push every 4 hours PRN      enoxaparin Injectable 40 milliGRAM(s) SubCutaneous daily              chlorhexidine 2% Cloths 1 Application(s) Topical <User Schedule>        Mode: CPAP with PS  PEEP: 5  PS: 5      I&O's Detail    25 Aug 2020 07:01  -  26 Aug 2020 07:00  --------------------------------------------------------  IN:    multiple electrolytes Injection Type 1 Bolus: 1000 mL    propofol Infusion: 55 mL    Solution: 416.5 mL    Solution: 100 mL  Total IN: 1571.5 mL    OUT:    Indwelling Catheter - Urethral: 220 mL    Voided: 650 mL  Total OUT: 870 mL    Total NET: 701.5 mL            RADIOLOGY/ Microbiology/ Labs: reviewed

## 2021-02-19 NOTE — LACTATION NOTE
This note was copied from a baby's chart. Lactation Progress Note      Data:   F/U with primip 1PO with breastfeeding and lactation rounds. Infant is 37.6 weeks gestation, mob off Magso4, and infant over 25 hours old. MOB states that infant is doing well for the most part with feedings overnight, but has been somewhat sleepy with feedings today. States that she is concerned how much breast milk infant is getting. Nipples are intact with minimal soreness noted. Infant output established 6 urine/ 6 stool, weight loss @ 7%. Action: Introduced self to patient as Lactation RN, name and phone number written on white board in room. Reviewed infant feeding log with family. Explained to family what infant is transferring from breast is colostrum at this time, and amounts were discussed. Reassured family of infant output being reassuring with several good feedings noted since delivery. However, with infant being LPT, weight loss today of 7% this morning, along with mob bring a primip with , Pre E, MagSo4 after delivery, recommendations with family in regards to initiating pumping along with direct breastfeeding would be appropriate. Risk factors with LPT infant were discussed in regards to breastfeeding, weight loss, and jaundice. Encouraged STS with infant at breast, and offering breast every 2-3 hours providing breast compression at times to encourage nutritive suck burst. Also reviewed with family the process of lactogenesis, and that collecting a few gtts of colostrum with pumping sessions at this time is normal, and then will see increasing amounts over the next few dayst. Cups and syringes were brought to room and mob was shown how to collect her colostrum as needed. Breast Pump also brought to room with instruction given on use and placed at bedside. MOB agreeable to initiating pumping after breastfeeding attempts q3 hours, as she works on infant latching, and mature milk production.  Discussed infant weight loss over the next few days, and appropriate output infant should be having. LC also assisted mob with placing infant in cross cradle hold to right breast. LC observed a good 10 minutes of feeding at breast. Reviewed infant feeding cues and encouraged mother to allow infant to breast feed on demand, a minimum of 8-12 times a day after the first day of life. Binder and breast feeding log reviewed, all questions answered. Mother instructed to call Lactation nurse for F/U care as needed. Response: MOB pleased with infant latch using cross cradle hold. Verbalizes understanding of breastfeeding education that was provided, along with pumping education. Will call for f/u care as needed during her stay.

## 2021-02-20 VITALS
OXYGEN SATURATION: 98 % | DIASTOLIC BLOOD PRESSURE: 83 MMHG | TEMPERATURE: 98.1 F | WEIGHT: 182 LBS | HEIGHT: 64 IN | HEART RATE: 70 BPM | BODY MASS INDEX: 31.07 KG/M2 | SYSTOLIC BLOOD PRESSURE: 141 MMHG | RESPIRATION RATE: 15 BRPM

## 2021-02-20 PROCEDURE — 6370000000 HC RX 637 (ALT 250 FOR IP): Performed by: OBSTETRICS & GYNECOLOGY

## 2021-02-20 RX ORDER — IBUPROFEN 800 MG/1
800 TABLET ORAL EVERY 6 HOURS PRN
Qty: 120 TABLET | Refills: 3 | Status: SHIPPED | OUTPATIENT
Start: 2021-02-20

## 2021-02-20 RX ORDER — FERROUS SULFATE 325(65) MG
325 TABLET ORAL 2 TIMES DAILY WITH MEALS
Qty: 30 TABLET | Refills: 3 | Status: SHIPPED | OUTPATIENT
Start: 2021-02-20

## 2021-02-20 RX ORDER — PSEUDOEPHEDRINE HCL 30 MG
100 TABLET ORAL 2 TIMES DAILY PRN
Qty: 60 CAPSULE | Refills: 2 | Status: SHIPPED | OUTPATIENT
Start: 2021-02-20

## 2021-02-20 RX ADMIN — FERROUS SULFATE TAB 325 MG (65 MG ELEMENTAL FE) 325 MG: 325 (65 FE) TAB at 08:37

## 2021-02-20 RX ADMIN — DOCUSATE SODIUM 100 MG: 100 CAPSULE, LIQUID FILLED ORAL at 08:37

## 2021-02-20 RX ADMIN — LEVOTHYROXINE SODIUM 75 MCG: 0.03 TABLET ORAL at 06:53

## 2021-02-20 RX ADMIN — IBUPROFEN 800 MG: 800 TABLET, FILM COATED ORAL at 04:37

## 2021-02-20 RX ADMIN — ACETAMINOPHEN 650 MG: 325 TABLET ORAL at 03:27

## 2021-02-20 RX ADMIN — ACETAMINOPHEN 650 MG: 325 TABLET ORAL at 08:37

## 2021-02-20 RX ADMIN — METFORMIN HYDROCHLORIDE 1 TABLET: 500 TABLET, EXTENDED RELEASE ORAL at 08:37

## 2021-02-20 ASSESSMENT — PAIN SCALES - GENERAL
PAINLEVEL_OUTOF10: 5
PAINLEVEL_OUTOF10: 4
PAINLEVEL_OUTOF10: 5

## 2021-02-20 NOTE — PLAN OF CARE
Problem: Discharge Planning:  Goal: Discharged to appropriate level of care  Description: Discharged to appropriate level of care  Outcome: Completed   Discharge instructions, f/u and medication instructions completed, pt verbalized understanding, dc paperwork given, SL removed, DSD applied.

## 2021-02-20 NOTE — LACTATION NOTE
This note was copied from a baby's chart. Lactation Progress Note      Data:     F/u on primip breast feeder, 2 po, planning to be discharged home today. Infant at 9.1% weight loss, reports appropriate output, and that infant cluster fed overnight. Pt states she was planning to pump if infant was sleepy and not feeding well, but infant has really picked up on breast feeding, and has not needed to pump more than 1x. Pt c/o tenderness with initial latch on, but states eases after a few seconds of latching. Pt also, states that her nipple is typically rounded when baby releases from the breast but occasionally, pinched. Action:  Introduced self to patient as [facility] on for this evening. Educated on importance of GISELA, gave tips to achieve, and explained how a good latch should look and feel. Instructed how to break the latch if ever pinching, shallow, or painful, and instructed that nipple should be rounded when baby releases from the breast. Reviewed care of sore nipples. Encouraged to call for [facility] to assess latch and for f/u support as needed before discharge home. Discharge breastfeeding education reviewed in discharge folder including breast care, prevention and treatment of engorgement, signs of hunger and satiety, when to expect mature milk supply, expected  feeding behaviors during the first few days and weeks of life, and how to know baby is getting enough at the breast including appropriate feedings, daily output, and weight trends. Education provided on preparing for return to work including pumping/expressing breast milk, collection, storage, preparation of expressed breast milk, and introduction of a bottle. Educated on risks related to use of pacifiers, artificial nipples, and supplements to the breastfeeding relationship and risks to milk supply. Encouraged exclusive breast feeding unless medical indication were to arise.  Encouraged much STS, offering the breast when baby is first beginning to wake and show signs of hunger, and every 2-3 hours if baby is sleepy and without feeding cues. Gave tips to wake sleepy baby as needed, and encouraged much STS and hand expression when offering the breast. Instructed that baby should have a minimum of 8-12 feedings in a 24 hour period after the first DOL. Encouraged to continue to track feedings and output until infant is consistently gaining and breast feeding and milk supply are well established. Reinforced how to contact available lactation support after discharge home including [employer] warm line, BF support group, and lactation outpatient clinic. Encouraged to utilize inpatient and outpatient support as needed for any breast feeding difficulty, questions, or concerns. Name and number provided on whiteboard. Encouraged to call for f/u support as needed. Response: Verbalized understanding and confident with breastfeeding and discharge home. Will call for f/u support prn.

## 2021-02-20 NOTE — DISCHARGE SUMMARY
Obstetrical Discharge Form    Gestational Age: 41w10d    Antepartum complications: preeclampsia with severe features    Date of Delivery: 21      Type of Delivery:  for failure to progress    Delivered By: Rufina Cadet:      Information for the patient's :  Jasiel Rivera [[id number]]   APGAR One: 8     Information for the patient's :  Jasiel Rivera [[id number]]   APGAR Five: 9     Information for the patient's :  Jasiel Rivera [[id number]]   Birth Weight: 7 lb 11.1 oz (3.49 kg)       Anesthesia: Epidural    Intrapartum complications: None    Postpartum complications: anemia    Discharge Medication:    [facility], [address] [facility] Medication Instructions [facility]:[id number]    Printed on:21 8166   Medication Information                      budesonide-formoterol (SYMBICORT) 80-4.5 MCG/ACT AERO  Inhale 2 puffs into the lungs 2 times daily             cetirizine (ZYRTEC) 10 MG tablet  Take 10 mg by mouth daily             docusate sodium (COLACE, DULCOLAX) 100 MG CAPS  Take 100 mg by mouth 2 times daily as needed for Constipation             ferrous sulfate (IRON 325) 325 (65 Fe) MG tablet  Take 1 tablet by mouth 2 times daily (with meals)             ibuprofen (ADVIL;MOTRIN) 800 MG tablet  Take 1 tablet by mouth every 6 hours as needed (Pain level 1 - 10)             levothyroxine (SYNTHROID) 75 MCG tablet  Take 75 mcg by mouth Daily             Prenatal MV-Min-Fe Fum-FA-DHA (PRENATAL 1 PO)  Take 1 tablet by mouth daily                  Discharge Condition:  stable    Discharge Date: 2021    PLAN:  Follow up in 2-3 days for blood pressure check  All questions answered  D/C summary begun at delivery for D/C planning purposes, any delay in discharge from ordered D/C date due to  factors.

## 2021-02-20 NOTE — PLAN OF CARE
Problem: Discharge Planning:  Goal: Discharged to appropriate level of care  Description: Discharged to appropriate level of care  2/19/2021 1928 by Yoel Mendoza RN  Outcome: Ongoing  2/19/2021 0758 by Adrienne Banegas RN  Outcome: Ongoing     Problem: Fluid Volume - Imbalance:  Goal: Absence of postpartum hemorrhage signs and symptoms  Description: Absence of postpartum hemorrhage signs and symptoms  2/19/2021 1928 by Yoel Mendoza RN  Outcome: Ongoing  2/19/2021 0758 by Adrienne Banegas RN  Outcome: Ongoing  Goal: Absence of imbalanced fluid volume signs and symptoms  Description: Absence of imbalanced fluid volume signs and symptoms  2/19/2021 1928 by Yoel Mendoza RN  Outcome: Ongoing  2/19/2021 0758 by Adrienne Banegas RN  Outcome: Ongoing     Problem: Infection - Surgical Site:  Goal: Will show no infection signs and symptoms  Description: Will show no infection signs and symptoms  2/19/2021 1928 by Yoel Mendoza RN  Outcome: Ongoing  2/19/2021 0758 by Adrienne Banegas RN  Outcome: Ongoing     Problem: Mood - Altered:  Goal: Mood stable  Description: Mood stable  2/19/2021 1928 by Yoel Mendoza RN  Outcome: Ongoing  2/19/2021 0758 by Adrienne Banegas RN  Outcome: Ongoing     Problem: Nausea/Vomiting:  Goal: Absence of nausea/vomiting  Description: Absence of nausea/vomiting  2/19/2021 1928 by Yoel Mendoza RN  Outcome: Ongoing  2/19/2021 0758 by Adrienne Banegas RN  Outcome: Ongoing     Problem: Pain - Acute:  Goal: Pain level will decrease  Description: Pain level will decrease  2/19/2021 1928 by Yoel Mendoza RN  Outcome: Ongoing  2/19/2021 0758 by Adrienne Banegas RN  Outcome: Ongoing     Problem: Urinary Retention:  Goal: Urinary elimination within specified parameters  Description: Urinary elimination within specified parameters  2/19/2021 1928 by Yoel Mendoza RN  Outcome: Ongoing  2/19/2021 0758 by Adrienne Banegas RN  Outcome: Ongoing     Problem: Venous Thromboembolism:  Goal: Will show no signs or symptoms of venous thromboembolism  Description: Will show no signs or symptoms of venous thromboembolism  2/19/2021 1928 by Yoel Mendoza RN  Outcome: Ongoing  2/19/2021 0758 by Adrienne Banegas RN  Outcome: Ongoing  Goal: Absence of signs or symptoms of impaired coagulation  Description: Absence of signs or symptoms of impaired coagulation  2/19/2021 1928 by Yoel Mendoza RN  Outcome: Ongoing  2/19/2021 0758 by Adrienne Banegas RN  Outcome: Ongoing

## 2021-02-20 NOTE — FLOWSHEET NOTE
Discharge Phone Call Log  Patient Name: Lachelle Barber     [facility] Care Provider: Juan Ellis MD Discharge Date: 2021    Disposition of baby:    Phone Number: [phone] (home)     Attempts to Contact:  Date:    Nurse  Date:    Nurse  Date:    Nurse    1. Now that you are at home is your pain being well controlled? Y/N   What pain reducing measures are you using? ____________________________________        Information for the patient's :  Norma Stark [[id number]]   Delivery Method: , Low Transverse     2. Are you currently  having any infant feeding issues? Y/N _____________________________ If yes, please explain: __________________________________________________________________  3. If breastfeeding, were you satisfied with the breastfeeding support services offered? Y/N  4.  Have you had to supplement? Y/N If yes, please explain: _____________________________________________________       Did you supplement while in the hospital, or begin formula supplementation at home?________________________________________  5. Did your OB provider offer you information about the benefits of breastfeeding during your prenatal visits? Y/N  6.  Have you made or have you already had your first appointment with the baby's doctor? Y/N If no, do you know when to schedule it? Y/N   7.  Have you scheduled your follow-up appointment? Y/N  If no, do you know when to schedule it? Y/N  8. Did staff discuss safe sleep during your stay? Y/N  Did you see the wall cling posted in your room explaining how to keep you and your baby safe? Y/N  10. Did your nurses and physicians include you in the plan of care, communicating with you respectfully? Y/N If no, please explain __________________________  11. Is there anyone in particular you would like to mention who provided care for you? ________________________________  12. Did your discharge occur in a timely manner?   Y/N If no, please

## 2021-02-24 NOTE — ADT AUTH CERT
failed to progress in labor, Csection delivery today    ( ) * Delivery not indicated imminently    ( ) * Discharge plans and education understood    Activity    ( ) * Ambulatory or acceptable for next level of care    Routes    (X) * Oral hydration, medications, and diet    2/24/2021 9:28 AM EST by Maite Rodgers      ancef 2000mg iv given  toradol 30mg iv q6hrs  LR ivf @ 75 cc/hr  Magnesium iv gtt  Pitocin iv gtt dc'd @ 0511hr  zofran 4mg iv given  advil is given    Interventions    (X) Fetal monitoring, including daily fetal movements    * Milestone
